# Patient Record
Sex: FEMALE | Race: WHITE | Employment: OTHER | URBAN - METROPOLITAN AREA
[De-identification: names, ages, dates, MRNs, and addresses within clinical notes are randomized per-mention and may not be internally consistent; named-entity substitution may affect disease eponyms.]

---

## 2018-01-20 PROBLEM — K92.2 GI BLEED: Status: ACTIVE | Noted: 2018-01-20

## 2018-01-21 PROBLEM — I10 ESSENTIAL HYPERTENSION: Chronic | Status: ACTIVE | Noted: 2018-01-21

## 2018-01-21 PROBLEM — E11.9 TYPE 2 DIABETES MELLITUS WITHOUT COMPLICATION (HCC): Chronic | Status: ACTIVE | Noted: 2018-01-21

## 2018-01-21 PROBLEM — J45.20 MILD INTERMITTENT ASTHMA WITHOUT COMPLICATION: Chronic | Status: ACTIVE | Noted: 2018-01-21

## 2018-01-22 PROBLEM — D62 ACUTE BLOOD LOSS ANEMIA: Status: ACTIVE | Noted: 2018-01-22

## 2019-03-18 LAB
AVERAGE GLUCOSE: NORMAL
HBA1C MFR BLD: 6.1 %

## 2019-04-01 ENCOUNTER — HOSPITAL ENCOUNTER (OUTPATIENT)
Dept: MAMMOGRAPHY | Age: 64
Discharge: HOME | End: 2019-04-03
Payer: MEDICARE

## 2019-04-01 DIAGNOSIS — Z12.31 VISIT FOR SCREENING MAMMOGRAM: ICD-10-CM

## 2019-05-23 ENCOUNTER — OFFICE VISIT (OUTPATIENT)
Dept: FAMILY MEDICINE CLINIC | Age: 64
End: 2019-05-23
Payer: COMMERCIAL

## 2019-05-23 VITALS
WEIGHT: 246 LBS | TEMPERATURE: 97.2 F | SYSTOLIC BLOOD PRESSURE: 134 MMHG | BODY MASS INDEX: 39.53 KG/M2 | HEART RATE: 71 BPM | OXYGEN SATURATION: 98 % | HEIGHT: 66 IN | DIASTOLIC BLOOD PRESSURE: 82 MMHG

## 2019-05-23 DIAGNOSIS — R73.9 HYPERGLYCEMIA: ICD-10-CM

## 2019-05-23 DIAGNOSIS — R19.7 DIARRHEA, UNSPECIFIED TYPE: Primary | ICD-10-CM

## 2019-05-23 LAB
CHP ED QC CHECK: NORMAL
GLUCOSE BLD-MCNC: 138 MG/DL

## 2019-05-23 PROCEDURE — 99214 OFFICE O/P EST MOD 30 MIN: CPT | Performed by: PHYSICIAN ASSISTANT

## 2019-05-23 PROCEDURE — 82962 GLUCOSE BLOOD TEST: CPT | Performed by: PHYSICIAN ASSISTANT

## 2019-05-23 RX ORDER — ALBUTEROL SULFATE 90 UG/1
AEROSOL, METERED RESPIRATORY (INHALATION)
COMMUNITY
Start: 2014-01-16 | End: 2022-03-14

## 2019-05-23 RX ORDER — FLUTICASONE PROPIONATE 110 UG/1
AEROSOL, METERED RESPIRATORY (INHALATION)
COMMUNITY
Start: 2015-09-29 | End: 2022-03-14

## 2019-05-23 RX ORDER — IPRATROPIUM BROMIDE AND ALBUTEROL SULFATE 2.5; .5 MG/3ML; MG/3ML
SOLUTION RESPIRATORY (INHALATION)
COMMUNITY
Start: 2014-01-16 | End: 2020-04-30

## 2019-05-23 NOTE — PROGRESS NOTES
19  Elio Ek : 1955 Sex: female  Age 59 y.o. Subjective:  Chief Complaint   Patient presents with    Fever    Headache    Diarrhea     yellow,liquid    Nausea    Diabetes     sugar has been high         HPI:   Elio Mcqueen , 59 y.o. female presents to [facility] care for evaluation of diarrhea. The patient states that last weekend she started with some nausea, vomiting, and diarrhea. The patient states that she's had some intermittent crampy abdominal pains and has been having this yellow foul-smelling diarrhea for the last couple of days. The diarrhea seems to be improving. She is able to eat and drink but then usually has an episode of diarrhea shortly thereafter. The patient is not having any specific abdominal pain at this time. Her blood sugars have been on the higher side in the 160s and 170s even without eating. The patient is not having any urinary symptoms. The patient has not been on any recent antibiotics. Not exposed to anyone and nursing facilities. The patient has not traveled out of the country. ROS:   Unless otherwise stated in this report the patient's positive and negative responses for review of systems for constitutional, eyes, ENT, cardiovascular, respiratory, gastrointestinal, neurological, , musculoskeletal, and integument systems and related systems to the presenting problem are either stated in the history of present illness or were not pertinent or were negative for the symptoms and/or complaints related to the presenting medical problem. Positives and pertinent negatives as per HPI. All others reviewed and are negative.       PMH:     Past Medical History:   Diagnosis Date    Depression     Hypertension     Mild intermittent asthma without complication     Osteoarthritis of left knee 2012    Urinary incontinence        Past Surgical History:   Procedure Laterality Date    APPENDECTOMY      ARTERY SURGERY      CARPAL TUNNEL RELEASE      ENDOSCOPY, COLON, DIAGNOSTIC      KNEE SURGERY      TUBAL LIGATION       Medications:     Current Outpatient Medications:     fluticasone (FLOVENT HFA) 110 MCG/ACT inhaler, Inhale into the lungs, Disp: , Rfl:     ipratropium-albuterol (DUONEB) 0.5-2.5 (3) MG/3ML SOLN nebulizer solution, , Disp: , Rfl:     albuterol sulfate HFA (PROAIR HFA) 108 (90 Base) MCG/ACT inhaler, Inhale into the lungs, Disp: , Rfl:     metFORMIN (GLUCOPHAGE) 1000 MG tablet, Take 1,000 mg by mouth 2 times daily (with meals), Disp: , Rfl:     losartan (COZAAR) 100 MG tablet, Take 100 mg by mouth every morning , Disp: , Rfl:     METOPROLOL TARTRATE, Take 12.5 mg by mouth 2 times daily , Disp: , Rfl:     citalopram (CELEXA) 20 MG tablet, Take 20 mg by mouth nightly , Disp: , Rfl:     hydrochlorothiazide (MICROZIDE) 12.5 MG capsule, 12.5 mg every morning., Disp: , Rfl:     Allergies: Allergies   Allergen Reactions    Levofloxacin Rash    Ancef [Cefazolin Sodium] Swelling    Lisinopril Swelling    Lotensin [Benazepril Hcl]     Norco [Hydrocodone-Acetaminophen]      Break out hives       Social History:     Social History     Tobacco Use    Smoking status: Never Smoker    Smokeless tobacco: Never Used   Substance Use Topics    Alcohol use: No    Drug use: No       Physical Exam:     Vitals:    05/23/19 0929   BP: 134/82   Pulse: 71   Temp: 97.2 °F (36.2 °C)   SpO2: 98%   Weight: 246 lb (111.6 kg)   Height: 5' 6\" (1.676 m)       Exam:  Physical Exam  Nurses note and vital signs reviewed and patient is not hypoxic. General: The patient appears well and in no apparent distress. Patient is resting comfortably on cart. Skin: Warm, dry, no pallor noted. There is no rash noted. Head: Normocephalic, atraumatic.   Eye: Normal conjunctiva  Ears, Nose, Mouth, and Throat: Oral mucosa is moist  Cardiovascular: Regular Rate and Rhythm  Respiratory: Patient is in no distress, no accessory muscle use, lungs are clear to auscultation, no wheezing, crackles or rhonchi  Back: Non-tender, no CVA tenderness bilaterally to percussion. GI: Normal bowel sounds, no tenderness to palpation, no masses appreciated. No rebound, guarding, or rigidity noted. Musculoskeletal: The patient has no evidence of calf tenderness, no pitting edema, symmetrical pulses noted bilaterally  Neurological: A&O x4, normal speech      Testing:     Stool cultures pending. Results for orders placed or performed in visit on 05/23/19   POCT Glucose   Result Value Ref Range    Glucose 138 mg/dL    QC OK? Medical Decision Making:     The patient on arrival does not appear to be in any apparent distress or discomfort. The patient will have her glucose checked here. Also set the patient up for stool cultures. The patient will return if any signs or symptoms worsen. We escorted to the lab to have these evaluated. The patient's glucose is 138. She does not appear to be toxic or lethargic. Moist mucous membranes. Patient will have close follow-up with PCP. The patient was instructed on probiotics. Patient is to return if any signs or symptoms worsen. The patient has no other questions or concerns at this time. Clinical Impression:   Giselle Aburto was seen today for fever, headache, diarrhea, nausea and diabetes. Diagnoses and all orders for this visit:    Diarrhea, unspecified type  -     CULTURE STOOL; Future  -     CLOSTRIDIUM DIFFICILE EIA; Future    Hyperglycemia  -     POCT Glucose        The patient is to call for any concerns or return if any of the signs or symptoms worsen. The patient is to follow-up with PCP in the next 2-3 days for repeat evaluation repeat assessment or go directly to the emergency department.      SIGNATURE: Tammie Bond III, PA-C

## 2019-06-03 ENCOUNTER — HOSPITAL ENCOUNTER (OUTPATIENT)
Dept: MAMMOGRAPHY | Age: 64
Discharge: HOME | End: 2019-06-05
Payer: MEDICARE

## 2019-06-03 DIAGNOSIS — Z12.39 BREAST CANCER SCREENING: ICD-10-CM

## 2019-06-03 PROCEDURE — 77067 SCR MAMMO BI INCL CAD: CPT

## 2019-08-15 RX ORDER — LOSARTAN POTASSIUM 100 MG/1
100 TABLET ORAL EVERY MORNING
Qty: 30 TABLET | Refills: 2 | Status: SHIPPED | OUTPATIENT
Start: 2019-08-15 | End: 2019-10-04 | Stop reason: SDUPTHER

## 2019-08-27 RX ORDER — HYDROCHLOROTHIAZIDE 12.5 MG/1
CAPSULE, GELATIN COATED ORAL
Qty: 90 CAPSULE | Refills: 1 | Status: SHIPPED | OUTPATIENT
Start: 2019-08-27 | End: 2019-10-04 | Stop reason: SDUPTHER

## 2019-08-28 RX ORDER — METFORMIN HYDROCHLORIDE 500 MG/1
TABLET, EXTENDED RELEASE ORAL
Qty: 120 TABLET | Refills: 5 | OUTPATIENT
Start: 2019-08-28

## 2019-09-06 RX ORDER — METFORMIN HYDROCHLORIDE 500 MG/1
TABLET, EXTENDED RELEASE ORAL
Qty: 120 TABLET | Refills: 1 | Status: SHIPPED | OUTPATIENT
Start: 2019-09-06 | End: 2019-10-04 | Stop reason: SDUPTHER

## 2019-09-06 NOTE — TELEPHONE ENCOUNTER
Last Appointment:  Visit date not found  Future Appointments   Date Time Provider Sasha Morales   10/4/2019  1:00 PM Ashleigh Harris

## 2019-10-01 ENCOUNTER — HOSPITAL ENCOUNTER (OUTPATIENT)
Age: 64
Discharge: HOME | End: 2019-10-03
Payer: MEDICARE

## 2019-10-01 LAB
ALBUMIN SERPL-MCNC: 4.1 G/DL (ref 3.5–5.2)
ALP BLD-CCNC: 68 U/L (ref 35–104)
ALT SERPL-CCNC: 17 U/L (ref 0–32)
ANION GAP SERPL CALCULATED.3IONS-SCNC: 16 MMOL/L (ref 7–16)
AST SERPL-CCNC: 22 U/L (ref 0–31)
BASOPHILS ABSOLUTE: 0.05 E9/L (ref 0–0.2)
BASOPHILS RELATIVE PERCENT: 0.7 % (ref 0–2)
BILIRUB SERPL-MCNC: 0.5 MG/DL (ref 0–1.2)
BUN BLDV-MCNC: 16 MG/DL (ref 8–23)
CALCIUM SERPL-MCNC: 9.6 MG/DL (ref 8.6–10.2)
CHLORIDE BLD-SCNC: 98 MMOL/L (ref 98–107)
CHOLESTEROL, TOTAL: 183 MG/DL (ref 0–199)
CO2: 24 MMOL/L (ref 22–29)
CREAT SERPL-MCNC: 0.7 MG/DL (ref 0.5–1)
EOSINOPHILS ABSOLUTE: 0.1 E9/L (ref 0.05–0.5)
EOSINOPHILS RELATIVE PERCENT: 1.5 % (ref 0–6)
GFR AFRICAN AMERICAN: >60
GFR NON-AFRICAN AMERICAN: >60 ML/MIN/1.73
GLUCOSE BLD-MCNC: 132 MG/DL (ref 74–99)
HBA1C MFR BLD: 6.2 % (ref 4–5.6)
HCT VFR BLD CALC: 42.3 % (ref 34–48)
HDLC SERPL-MCNC: 55 MG/DL
HEMOGLOBIN: 13.6 G/DL (ref 11.5–15.5)
IMMATURE GRANULOCYTES #: 0.02 E9/L
IMMATURE GRANULOCYTES %: 0.3 % (ref 0–5)
LDL CHOLESTEROL CALCULATED: 88 MG/DL (ref 0–99)
LYMPHOCYTES ABSOLUTE: 1.96 E9/L (ref 1.5–4)
LYMPHOCYTES RELATIVE PERCENT: 29.3 % (ref 20–42)
MCH RBC QN AUTO: 27.7 PG (ref 26–35)
MCHC RBC AUTO-ENTMCNC: 32.2 % (ref 32–34.5)
MCV RBC AUTO: 86.2 FL (ref 80–99.9)
MONOCYTES ABSOLUTE: 0.57 E9/L (ref 0.1–0.95)
MONOCYTES RELATIVE PERCENT: 8.5 % (ref 2–12)
NEUTROPHILS ABSOLUTE: 3.99 E9/L (ref 1.8–7.3)
NEUTROPHILS RELATIVE PERCENT: 59.7 % (ref 43–80)
PDW BLD-RTO: 14.7 FL (ref 11.5–15)
PLATELET # BLD: 209 E9/L (ref 130–450)
PMV BLD AUTO: 10.9 FL (ref 7–12)
POTASSIUM SERPL-SCNC: 4.9 MMOL/L (ref 3.5–5)
RBC # BLD: 4.91 E12/L (ref 3.5–5.5)
SODIUM BLD-SCNC: 138 MMOL/L (ref 132–146)
TOTAL PROTEIN: 7.4 G/DL (ref 6.4–8.3)
TRIGL SERPL-MCNC: 200 MG/DL (ref 0–149)
VLDLC SERPL CALC-MCNC: 40 MG/DL
WBC # BLD: 6.7 E9/L (ref 4.5–11.5)

## 2019-10-01 PROCEDURE — 85025 COMPLETE CBC W/AUTO DIFF WBC: CPT

## 2019-10-01 PROCEDURE — 36415 COLL VENOUS BLD VENIPUNCTURE: CPT

## 2019-10-01 PROCEDURE — 80061 LIPID PANEL: CPT

## 2019-10-01 PROCEDURE — 83036 HEMOGLOBIN GLYCOSYLATED A1C: CPT

## 2019-10-01 PROCEDURE — 80053 COMPREHEN METABOLIC PANEL: CPT

## 2019-10-03 ASSESSMENT — ENCOUNTER SYMPTOMS
BLOOD IN STOOL: 0
TROUBLE SWALLOWING: 0
SHORTNESS OF BREATH: 0
CHEST TIGHTNESS: 0
ABDOMINAL PAIN: 0

## 2019-10-04 ENCOUNTER — OFFICE VISIT (OUTPATIENT)
Dept: PRIMARY CARE CLINIC | Age: 64
End: 2019-10-04
Payer: MEDICARE

## 2019-10-04 VITALS
WEIGHT: 242 LBS | TEMPERATURE: 97.5 F | DIASTOLIC BLOOD PRESSURE: 82 MMHG | OXYGEN SATURATION: 97 % | BODY MASS INDEX: 38.89 KG/M2 | HEIGHT: 66 IN | SYSTOLIC BLOOD PRESSURE: 128 MMHG | HEART RATE: 68 BPM

## 2019-10-04 DIAGNOSIS — J45.909 MILD ASTHMA WITHOUT COMPLICATION, UNSPECIFIED WHETHER PERSISTENT: ICD-10-CM

## 2019-10-04 DIAGNOSIS — R53.83 FATIGUE, UNSPECIFIED TYPE: ICD-10-CM

## 2019-10-04 DIAGNOSIS — E78.2 MIXED HYPERLIPIDEMIA: ICD-10-CM

## 2019-10-04 DIAGNOSIS — I10 ESSENTIAL HYPERTENSION: ICD-10-CM

## 2019-10-04 DIAGNOSIS — F32.0 CURRENT MILD EPISODE OF MAJOR DEPRESSIVE DISORDER WITHOUT PRIOR EPISODE (HCC): ICD-10-CM

## 2019-10-04 DIAGNOSIS — E11.9 TYPE 2 DIABETES MELLITUS WITHOUT COMPLICATION, WITHOUT LONG-TERM CURRENT USE OF INSULIN (HCC): Primary | Chronic | ICD-10-CM

## 2019-10-04 PROCEDURE — 1036F TOBACCO NON-USER: CPT | Performed by: FAMILY MEDICINE

## 2019-10-04 PROCEDURE — G0444 DEPRESSION SCREEN ANNUAL: HCPCS | Performed by: FAMILY MEDICINE

## 2019-10-04 PROCEDURE — G8484 FLU IMMUNIZE NO ADMIN: HCPCS | Performed by: FAMILY MEDICINE

## 2019-10-04 PROCEDURE — 99214 OFFICE O/P EST MOD 30 MIN: CPT | Performed by: FAMILY MEDICINE

## 2019-10-04 PROCEDURE — 3044F HG A1C LEVEL LT 7.0%: CPT | Performed by: FAMILY MEDICINE

## 2019-10-04 PROCEDURE — G8417 CALC BMI ABV UP PARAM F/U: HCPCS | Performed by: FAMILY MEDICINE

## 2019-10-04 PROCEDURE — 2022F DILAT RTA XM EVC RTNOPTHY: CPT | Performed by: FAMILY MEDICINE

## 2019-10-04 PROCEDURE — 3017F COLORECTAL CA SCREEN DOC REV: CPT | Performed by: FAMILY MEDICINE

## 2019-10-04 PROCEDURE — G8428 CUR MEDS NOT DOCUMENT: HCPCS | Performed by: FAMILY MEDICINE

## 2019-10-04 RX ORDER — METOPROLOL TARTRATE 50 MG/1
25 TABLET, FILM COATED ORAL 2 TIMES DAILY
Qty: 60 TABLET | Refills: 2 | Status: SHIPPED
Start: 2019-10-04 | End: 2020-04-01

## 2019-10-04 RX ORDER — LOSARTAN POTASSIUM 100 MG/1
100 TABLET ORAL EVERY MORNING
Qty: 30 TABLET | Refills: 5 | Status: SHIPPED | OUTPATIENT
Start: 2019-10-04 | End: 2019-11-09 | Stop reason: SDUPTHER

## 2019-10-04 RX ORDER — METOPROLOL TARTRATE 50 MG/1
25 TABLET, FILM COATED ORAL 2 TIMES DAILY
Refills: 0 | COMMUNITY
Start: 2019-07-10 | End: 2019-10-04 | Stop reason: SDUPTHER

## 2019-10-04 RX ORDER — HYDROCHLOROTHIAZIDE 12.5 MG/1
CAPSULE, GELATIN COATED ORAL
Qty: 90 CAPSULE | Refills: 2 | Status: SHIPPED
Start: 2019-10-04 | End: 2020-04-30 | Stop reason: SDUPTHER

## 2019-10-04 RX ORDER — CITALOPRAM 20 MG/1
TABLET ORAL
Qty: 45 TABLET | Refills: 5 | Status: SHIPPED
Start: 2019-10-04 | End: 2020-04-30 | Stop reason: SDUPTHER

## 2019-10-04 RX ORDER — OXYBUTYNIN CHLORIDE 5 MG/1
5 TABLET ORAL 2 TIMES DAILY
Qty: 60 TABLET | Refills: 3 | Status: SHIPPED
Start: 2019-10-04 | End: 2020-02-10

## 2019-10-04 RX ORDER — METFORMIN HYDROCHLORIDE 500 MG/1
TABLET, EXTENDED RELEASE ORAL
Qty: 120 TABLET | Refills: 2 | Status: SHIPPED
Start: 2019-10-04 | End: 2020-03-04 | Stop reason: SDUPTHER

## 2019-10-04 ASSESSMENT — PATIENT HEALTH QUESTIONNAIRE - PHQ9
1. LITTLE INTEREST OR PLEASURE IN DOING THINGS: 1
SUM OF ALL RESPONSES TO PHQ QUESTIONS 1-9: 6
9. THOUGHTS THAT YOU WOULD BE BETTER OFF DEAD, OR OF HURTING YOURSELF: 0
8. MOVING OR SPEAKING SO SLOWLY THAT OTHER PEOPLE COULD HAVE NOTICED. OR THE OPPOSITE, BEING SO FIGETY OR RESTLESS THAT YOU HAVE BEEN MOVING AROUND A LOT MORE THAN USUAL: 0
7. TROUBLE CONCENTRATING ON THINGS, SUCH AS READING THE NEWSPAPER OR WATCHING TELEVISION: 0
4. FEELING TIRED OR HAVING LITTLE ENERGY: 1
6. FEELING BAD ABOUT YOURSELF - OR THAT YOU ARE A FAILURE OR HAVE LET YOURSELF OR YOUR FAMILY DOWN: 0
5. POOR APPETITE OR OVEREATING: 1
SUM OF ALL RESPONSES TO PHQ9 QUESTIONS 1 & 2: 3
3. TROUBLE FALLING OR STAYING ASLEEP: 1
10. IF YOU CHECKED OFF ANY PROBLEMS, HOW DIFFICULT HAVE THESE PROBLEMS MADE IT FOR YOU TO DO YOUR WORK, TAKE CARE OF THINGS AT HOME, OR GET ALONG WITH OTHER PEOPLE: 0
SUM OF ALL RESPONSES TO PHQ QUESTIONS 1-9: 6
2. FEELING DOWN, DEPRESSED OR HOPELESS: 2

## 2019-11-11 RX ORDER — LOSARTAN POTASSIUM 100 MG/1
TABLET ORAL
Qty: 90 TABLET | Refills: 1 | Status: SHIPPED
Start: 2019-11-11 | End: 2020-04-06

## 2019-11-22 ENCOUNTER — OFFICE VISIT (OUTPATIENT)
Dept: FAMILY MEDICINE CLINIC | Age: 64
End: 2019-11-22
Payer: MEDICARE

## 2019-11-22 ENCOUNTER — TELEPHONE (OUTPATIENT)
Dept: FAMILY MEDICINE CLINIC | Age: 64
End: 2019-11-22

## 2019-11-22 VITALS — OXYGEN SATURATION: 97 % | SYSTOLIC BLOOD PRESSURE: 142 MMHG | DIASTOLIC BLOOD PRESSURE: 82 MMHG | HEART RATE: 90 BPM

## 2019-11-22 DIAGNOSIS — I10 ESSENTIAL HYPERTENSION: ICD-10-CM

## 2019-11-22 DIAGNOSIS — M25.572 ACUTE LEFT ANKLE PAIN: Primary | ICD-10-CM

## 2019-11-22 DIAGNOSIS — E11.9 TYPE 2 DIABETES MELLITUS WITHOUT COMPLICATION, WITHOUT LONG-TERM CURRENT USE OF INSULIN (HCC): ICD-10-CM

## 2019-11-22 PROCEDURE — 3044F HG A1C LEVEL LT 7.0%: CPT | Performed by: INTERNAL MEDICINE

## 2019-11-22 PROCEDURE — 99213 OFFICE O/P EST LOW 20 MIN: CPT | Performed by: INTERNAL MEDICINE

## 2019-11-22 PROCEDURE — G8417 CALC BMI ABV UP PARAM F/U: HCPCS | Performed by: INTERNAL MEDICINE

## 2019-11-22 PROCEDURE — 3017F COLORECTAL CA SCREEN DOC REV: CPT | Performed by: INTERNAL MEDICINE

## 2019-11-22 PROCEDURE — G8427 DOCREV CUR MEDS BY ELIG CLIN: HCPCS | Performed by: INTERNAL MEDICINE

## 2019-11-22 PROCEDURE — 2022F DILAT RTA XM EVC RTNOPTHY: CPT | Performed by: INTERNAL MEDICINE

## 2019-11-22 PROCEDURE — G8484 FLU IMMUNIZE NO ADMIN: HCPCS | Performed by: INTERNAL MEDICINE

## 2019-11-22 PROCEDURE — 1036F TOBACCO NON-USER: CPT | Performed by: INTERNAL MEDICINE

## 2019-11-22 RX ORDER — MELOXICAM 7.5 MG/1
7.5 TABLET ORAL DAILY
Qty: 30 TABLET | Refills: 0 | Status: SHIPPED | OUTPATIENT
Start: 2019-11-22 | End: 2020-01-02 | Stop reason: SDUPTHER

## 2020-01-02 RX ORDER — MELOXICAM 7.5 MG/1
7.5 TABLET ORAL DAILY
Qty: 90 TABLET | Refills: 1 | Status: SHIPPED
Start: 2020-01-02 | End: 2020-04-30 | Stop reason: SDUPTHER

## 2020-01-02 NOTE — TELEPHONE ENCOUNTER
Last Appointment:  10/4/2019  Future Appointments   Date Time Provider Sasha Mcgrathi   4/3/2020  1:00 PM Edward Edwards [address] [facility]      Patient calling in. She seen  through [facility] care and ws prescribed Mobic. Patient states this is working great and she wants to know if you would be ok calling in a refill?  Order pended, thank you

## 2020-02-03 ENCOUNTER — HOSPITAL ENCOUNTER (OUTPATIENT)
Age: 65
Discharge: HOME | End: 2020-02-05
Payer: MEDICARE

## 2020-02-03 LAB
ALBUMIN SERPL-MCNC: 4.1 G/DL (ref 3.5–5.2)
ALP BLD-CCNC: 63 U/L (ref 35–104)
ALT SERPL-CCNC: 18 U/L (ref 0–32)
ANION GAP SERPL CALCULATED.3IONS-SCNC: 16 MMOL/L (ref 7–16)
AST SERPL-CCNC: 24 U/L (ref 0–31)
BASOPHILS ABSOLUTE: 0.05 E9/L (ref 0–0.2)
BASOPHILS RELATIVE PERCENT: 0.8 % (ref 0–2)
BILIRUB SERPL-MCNC: 0.3 MG/DL (ref 0–1.2)
BUN BLDV-MCNC: 12 MG/DL (ref 8–23)
CALCIUM SERPL-MCNC: 9.4 MG/DL (ref 8.6–10.2)
CHLORIDE BLD-SCNC: 102 MMOL/L (ref 98–107)
CHOLESTEROL, TOTAL: 181 MG/DL (ref 0–199)
CO2: 22 MMOL/L (ref 22–29)
CREAT SERPL-MCNC: 0.7 MG/DL (ref 0.5–1)
EOSINOPHILS ABSOLUTE: 0.1 E9/L (ref 0.05–0.5)
EOSINOPHILS RELATIVE PERCENT: 1.5 % (ref 0–6)
FOLATE: 15 NG/ML (ref 4.8–24.2)
GFR AFRICAN AMERICAN: >60
GFR NON-AFRICAN AMERICAN: >60 ML/MIN/1.73
GLUCOSE BLD-MCNC: 117 MG/DL (ref 74–99)
HBA1C MFR BLD: 6 % (ref 4–5.6)
HCT VFR BLD CALC: 43.8 % (ref 34–48)
HDLC SERPL-MCNC: 56 MG/DL
HEMOGLOBIN: 13.4 G/DL (ref 11.5–15.5)
IMMATURE GRANULOCYTES #: 0.02 E9/L
IMMATURE GRANULOCYTES %: 0.3 % (ref 0–5)
LDL CHOLESTEROL CALCULATED: 90 MG/DL (ref 0–99)
LYMPHOCYTES ABSOLUTE: 1.83 E9/L (ref 1.5–4)
LYMPHOCYTES RELATIVE PERCENT: 28.2 % (ref 20–42)
MCH RBC QN AUTO: 28.2 PG (ref 26–35)
MCHC RBC AUTO-ENTMCNC: 30.6 % (ref 32–34.5)
MCV RBC AUTO: 92.2 FL (ref 80–99.9)
MONOCYTES ABSOLUTE: 0.62 E9/L (ref 0.1–0.95)
MONOCYTES RELATIVE PERCENT: 9.6 % (ref 2–12)
NEUTROPHILS ABSOLUTE: 3.87 E9/L (ref 1.8–7.3)
NEUTROPHILS RELATIVE PERCENT: 59.6 % (ref 43–80)
PDW BLD-RTO: 13.4 FL (ref 11.5–15)
PLATELET # BLD: 227 E9/L (ref 130–450)
PMV BLD AUTO: 10.7 FL (ref 7–12)
POTASSIUM SERPL-SCNC: 4.7 MMOL/L (ref 3.5–5)
RBC # BLD: 4.75 E12/L (ref 3.5–5.5)
SODIUM BLD-SCNC: 140 MMOL/L (ref 132–146)
T4 FREE: 1 NG/DL (ref 0.93–1.7)
TOTAL PROTEIN: 7.3 G/DL (ref 6.4–8.3)
TRIGL SERPL-MCNC: 176 MG/DL (ref 0–149)
TSH SERPL DL<=0.05 MIU/L-ACNC: 1.54 UIU/ML (ref 0.27–4.2)
VITAMIN B-12: 523 PG/ML (ref 211–946)
VLDLC SERPL CALC-MCNC: 35 MG/DL
WBC # BLD: 6.5 E9/L (ref 4.5–11.5)

## 2020-02-03 PROCEDURE — 80061 LIPID PANEL: CPT

## 2020-02-03 PROCEDURE — 84443 ASSAY THYROID STIM HORMONE: CPT

## 2020-02-03 PROCEDURE — 84439 ASSAY OF FREE THYROXINE: CPT

## 2020-02-03 PROCEDURE — 36415 COLL VENOUS BLD VENIPUNCTURE: CPT

## 2020-02-03 PROCEDURE — 82607 VITAMIN B-12: CPT

## 2020-02-03 PROCEDURE — 83036 HEMOGLOBIN GLYCOSYLATED A1C: CPT

## 2020-02-03 PROCEDURE — 80053 COMPREHEN METABOLIC PANEL: CPT

## 2020-02-03 PROCEDURE — 85025 COMPLETE CBC W/AUTO DIFF WBC: CPT

## 2020-02-03 PROCEDURE — 82746 ASSAY OF FOLIC ACID SERUM: CPT

## 2020-02-04 ENCOUNTER — TELEPHONE (OUTPATIENT)
Dept: FAMILY MEDICINE CLINIC | Age: 65
End: 2020-02-04

## 2020-02-10 RX ORDER — OXYBUTYNIN CHLORIDE 5 MG/1
TABLET ORAL
Qty: 60 TABLET | Refills: 0 | Status: SHIPPED
Start: 2020-02-10 | End: 2020-04-06

## 2020-03-04 RX ORDER — METFORMIN HYDROCHLORIDE 500 MG/1
TABLET, EXTENDED RELEASE ORAL
Qty: 120 TABLET | Refills: 2 | Status: SHIPPED
Start: 2020-03-04 | End: 2020-04-30 | Stop reason: SDUPTHER

## 2020-03-04 NOTE — TELEPHONE ENCOUNTER
Last Appointment:  10/4/2019  Future Appointments   Date Time Provider Sasha Morales   4/3/2020  1:00 PM Ethan Decker [address] [facility]      Patient calling in requesting refill on pended medication stated she does not have enough until he next appt.

## 2020-04-01 RX ORDER — METOPROLOL TARTRATE 50 MG/1
TABLET, FILM COATED ORAL
Qty: 60 TABLET | Refills: 0 | Status: SHIPPED
Start: 2020-04-01 | End: 2020-04-30 | Stop reason: SDUPTHER

## 2020-04-06 RX ORDER — LOSARTAN POTASSIUM 100 MG/1
TABLET ORAL
Qty: 30 TABLET | Refills: 0 | Status: SHIPPED
Start: 2020-04-06 | End: 2020-04-30 | Stop reason: SDUPTHER

## 2020-04-06 RX ORDER — OXYBUTYNIN CHLORIDE 5 MG/1
TABLET ORAL
Qty: 60 TABLET | Refills: 0 | Status: SHIPPED
Start: 2020-04-06 | End: 2020-04-30 | Stop reason: SDUPTHER

## 2020-04-11 LAB
ALBUMIN SERPL-MCNC: 4 G/DL
ALP BLD-CCNC: 60 U/L
ALT SERPL-CCNC: 27 U/L
ANION GAP SERPL CALCULATED.3IONS-SCNC: NORMAL MMOL/L
AST SERPL-CCNC: 29 U/L
BASOPHILS ABSOLUTE: 59 /ΜL
BASOPHILS RELATIVE PERCENT: 1 %
BILIRUB SERPL-MCNC: 0.4 MG/DL (ref 0.1–1.4)
BUN BLDV-MCNC: 15 MG/DL
CALCIUM SERPL-MCNC: 9.3 MG/DL
CHLORIDE BLD-SCNC: 102 MMOL/L
CHOLESTEROL, TOTAL: 185 MG/DL
CHOLESTEROL/HDL RATIO: 3.4
CO2: 27 MMOL/L
CREAT SERPL-MCNC: 0.58 MG/DL
EOSINOPHILS ABSOLUTE: 89 /ΜL
EOSINOPHILS RELATIVE PERCENT: 1.5 %
GFR CALCULATED: 97
GLUCOSE BLD-MCNC: 128 MG/DL
HCT VFR BLD CALC: 41.2 % (ref 36–46)
HDLC SERPL-MCNC: 54 MG/DL (ref 35–70)
HEMOGLOBIN: 13.7 G/DL (ref 12–16)
LDL CHOLESTEROL CALCULATED: 97 MG/DL (ref 0–160)
LYMPHOCYTES ABSOLUTE: 1817 /ΜL
LYMPHOCYTES RELATIVE PERCENT: 30.8 %
MCH RBC QN AUTO: 28.8 PG
MCHC RBC AUTO-ENTMCNC: 33.3 G/DL
MCV RBC AUTO: 86.6 FL
MONOCYTES ABSOLUTE: 513 /ΜL
MONOCYTES RELATIVE PERCENT: 8.7 %
NEUTROPHILS ABSOLUTE: 3422 /ΜL
NEUTROPHILS RELATIVE PERCENT: 58 %
PDW BLD-RTO: 13.2 %
PLATELET # BLD: 196 K/ΜL
PMV BLD AUTO: 11 FL
POTASSIUM SERPL-SCNC: 4.4 MMOL/L
RBC # BLD: 4.76 10^6/ΜL
SODIUM BLD-SCNC: 137 MMOL/L
T4 FREE: 1
TOTAL PROTEIN: 6.6
TRIGL SERPL-MCNC: 223 MG/DL
TSH SERPL DL<=0.05 MIU/L-ACNC: 1.73 UIU/ML
VLDLC SERPL CALC-MCNC: 131 MG/DL
WBC # BLD: 5.9 10^3/ML

## 2020-04-30 ENCOUNTER — VIRTUAL VISIT (OUTPATIENT)
Dept: PRIMARY CARE CLINIC | Age: 65
End: 2020-04-30
Payer: MEDICARE

## 2020-04-30 VITALS — SYSTOLIC BLOOD PRESSURE: 144 MMHG | DIASTOLIC BLOOD PRESSURE: 90 MMHG | WEIGHT: 253.6 LBS | BODY MASS INDEX: 40.93 KG/M2

## 2020-04-30 PROCEDURE — 3044F HG A1C LEVEL LT 7.0%: CPT | Performed by: FAMILY MEDICINE

## 2020-04-30 PROCEDURE — 1090F PRES/ABSN URINE INCON ASSESS: CPT | Performed by: FAMILY MEDICINE

## 2020-04-30 PROCEDURE — G8417 CALC BMI ABV UP PARAM F/U: HCPCS | Performed by: FAMILY MEDICINE

## 2020-04-30 PROCEDURE — 2022F DILAT RTA XM EVC RTNOPTHY: CPT | Performed by: FAMILY MEDICINE

## 2020-04-30 PROCEDURE — G8400 PT W/DXA NO RESULTS DOC: HCPCS | Performed by: FAMILY MEDICINE

## 2020-04-30 PROCEDURE — 4040F PNEUMOC VAC/ADMIN/RCVD: CPT | Performed by: FAMILY MEDICINE

## 2020-04-30 PROCEDURE — G8427 DOCREV CUR MEDS BY ELIG CLIN: HCPCS | Performed by: FAMILY MEDICINE

## 2020-04-30 PROCEDURE — 1036F TOBACCO NON-USER: CPT | Performed by: FAMILY MEDICINE

## 2020-04-30 PROCEDURE — 99213 OFFICE O/P EST LOW 20 MIN: CPT | Performed by: FAMILY MEDICINE

## 2020-04-30 PROCEDURE — 1123F ACP DISCUSS/DSCN MKR DOCD: CPT | Performed by: FAMILY MEDICINE

## 2020-04-30 PROCEDURE — 3017F COLORECTAL CA SCREEN DOC REV: CPT | Performed by: FAMILY MEDICINE

## 2020-04-30 RX ORDER — CITALOPRAM 20 MG/1
TABLET ORAL
Qty: 135 TABLET | Refills: 1 | Status: SHIPPED | OUTPATIENT
Start: 2020-04-30

## 2020-04-30 RX ORDER — MELOXICAM 7.5 MG/1
7.5 TABLET ORAL DAILY
Qty: 90 TABLET | Refills: 1 | Status: SHIPPED | OUTPATIENT
Start: 2020-04-30 | End: 2022-03-14

## 2020-04-30 RX ORDER — LOSARTAN POTASSIUM 100 MG/1
TABLET ORAL
Qty: 90 TABLET | Refills: 1 | Status: SHIPPED | OUTPATIENT
Start: 2020-04-30

## 2020-04-30 RX ORDER — METFORMIN HYDROCHLORIDE 500 MG/1
TABLET, EXTENDED RELEASE ORAL
Qty: 120 TABLET | Refills: 5 | Status: SHIPPED | OUTPATIENT
Start: 2020-04-30

## 2020-04-30 RX ORDER — OXYBUTYNIN CHLORIDE 5 MG/1
TABLET ORAL
Qty: 180 TABLET | Refills: 1 | Status: SHIPPED | OUTPATIENT
Start: 2020-04-30 | End: 2022-03-14

## 2020-04-30 RX ORDER — HYDROCHLOROTHIAZIDE 12.5 MG/1
CAPSULE, GELATIN COATED ORAL
Qty: 90 CAPSULE | Refills: 1 | Status: SHIPPED | OUTPATIENT
Start: 2020-04-30

## 2020-04-30 RX ORDER — METOPROLOL TARTRATE 50 MG/1
TABLET, FILM COATED ORAL
Qty: 45 TABLET | Refills: 1 | Status: SHIPPED | OUTPATIENT
Start: 2020-04-30

## 2020-04-30 ASSESSMENT — ENCOUNTER SYMPTOMS
CHEST TIGHTNESS: 0
ABDOMINAL PAIN: 0
BLOOD IN STOOL: 0
TROUBLE SWALLOWING: 0
SHORTNESS OF BREATH: 0

## 2020-04-30 ASSESSMENT — PATIENT HEALTH QUESTIONNAIRE - PHQ9
SUM OF ALL RESPONSES TO PHQ9 QUESTIONS 1 & 2: 0
1. LITTLE INTEREST OR PLEASURE IN DOING THINGS: 0
2. FEELING DOWN, DEPRESSED OR HOPELESS: 0
SUM OF ALL RESPONSES TO PHQ QUESTIONS 1-9: 0
SUM OF ALL RESPONSES TO PHQ QUESTIONS 1-9: 0

## 2020-05-27 ENCOUNTER — HOSPITAL ENCOUNTER (OUTPATIENT)
Age: 65
Discharge: HOME | End: 2020-05-29
Payer: MEDICARE

## 2020-05-27 LAB
FOLATE: >20 NG/ML (ref 4.8–24.2)
VITAMIN B-12: 562 PG/ML (ref 211–946)

## 2020-05-27 PROCEDURE — 82607 VITAMIN B-12: CPT

## 2020-05-27 PROCEDURE — 82746 ASSAY OF FOLIC ACID SERUM: CPT

## 2020-05-27 PROCEDURE — 36415 COLL VENOUS BLD VENIPUNCTURE: CPT

## 2021-03-24 ENCOUNTER — HOSPITAL ENCOUNTER (OUTPATIENT)
Dept: HOSPITAL 83 - ORTHO | Age: 66
Discharge: HOME | End: 2021-03-24
Attending: ORTHOPAEDIC SURGERY
Payer: MEDICARE

## 2021-03-24 DIAGNOSIS — M25.461: ICD-10-CM

## 2021-03-24 DIAGNOSIS — M25.861: ICD-10-CM

## 2021-03-24 DIAGNOSIS — M25.762: ICD-10-CM

## 2021-03-24 DIAGNOSIS — M17.0: Primary | ICD-10-CM

## 2021-05-19 ENCOUNTER — HOSPITAL ENCOUNTER (OUTPATIENT)
Dept: HOSPITAL 83 - CT | Age: 66
Discharge: HOME | End: 2021-05-19
Attending: ORTHOPAEDIC SURGERY
Payer: MEDICARE

## 2021-05-19 DIAGNOSIS — M16.11: ICD-10-CM

## 2021-05-19 DIAGNOSIS — M25.461: ICD-10-CM

## 2021-05-19 DIAGNOSIS — M71.21: ICD-10-CM

## 2021-05-19 DIAGNOSIS — M71.161: ICD-10-CM

## 2021-05-19 DIAGNOSIS — M17.11: Primary | ICD-10-CM

## 2021-07-02 ENCOUNTER — HOSPITAL ENCOUNTER (OUTPATIENT)
Dept: HOSPITAL 83 - LAB | Age: 66
Discharge: HOME | End: 2021-07-02
Attending: ORTHOPAEDIC SURGERY
Payer: MEDICARE

## 2021-07-02 DIAGNOSIS — Z01.818: Primary | ICD-10-CM

## 2021-07-02 DIAGNOSIS — M17.11: ICD-10-CM

## 2021-07-02 LAB
BASOPHILS # BLD AUTO: 0.1 10*3/UL (ref 0–0.1)
BASOPHILS NFR BLD AUTO: 0.7 % (ref 0–1)
BUN SERPL-MCNC: 16 MG/DL (ref 7–24)
CHLORIDE SERPL-SCNC: 106 MMOL/L (ref 98–107)
CREAT SERPL-MCNC: 0.66 MG/DL (ref 0.55–1.02)
EOSINOPHIL # BLD AUTO: 0.1 10*3/UL (ref 0–0.4)
EOSINOPHIL # BLD AUTO: 1.6 % (ref 1–4)
ERYTHROCYTE [DISTWIDTH] IN BLOOD BY AUTOMATED COUNT: 12.8 % (ref 0–14.5)
HCT VFR BLD AUTO: 43.3 % (ref 37–47)
LYMPHOCYTES # BLD AUTO: 2.1 10*3/UL (ref 1.3–4.4)
LYMPHOCYTES NFR BLD AUTO: 29.3 % (ref 27–41)
MCH RBC QN AUTO: 28.8 PG (ref 27–31)
MCHC RBC AUTO-ENTMCNC: 33.3 G/DL (ref 33–37)
MCV RBC AUTO: 86.6 FL (ref 81–99)
MONOCYTES # BLD AUTO: 0.6 10*3/UL (ref 0.1–1)
MONOCYTES NFR BLD MANUAL: 8.8 % (ref 3–9)
NEUT #: 4.4 10*3/UL (ref 2.3–7.9)
NEUT %: 59.5 % (ref 47–73)
NRBC BLD QL AUTO: 0 10*3/UL (ref 0–0)
PLATELET # BLD AUTO: 225 10*3/UL (ref 130–400)
PMV BLD AUTO: 10.4 FL (ref 9.6–12.3)
POTASSIUM SERPL-SCNC: 4.3 MMOL/L (ref 3.5–5.1)
RBC # BLD AUTO: 5 10*6/UL (ref 4.1–5.1)
SODIUM SERPL-SCNC: 135 MMOL/L (ref 136–145)
WBC NRBC COR # BLD AUTO: 7.3 10*3/UL (ref 4.8–10.8)

## 2021-07-13 ENCOUNTER — HOSPITAL ENCOUNTER (INPATIENT)
Dept: HOSPITAL 83 - SDC | Age: 66
LOS: 3 days | Discharge: HOME HEALTH | DRG: 470 | End: 2021-07-16
Attending: INTERNAL MEDICINE | Admitting: INTERNAL MEDICINE
Payer: MEDICARE

## 2021-07-13 VITALS — DIASTOLIC BLOOD PRESSURE: 82 MMHG

## 2021-07-13 VITALS — DIASTOLIC BLOOD PRESSURE: 84 MMHG

## 2021-07-13 VITALS — DIASTOLIC BLOOD PRESSURE: 80 MMHG

## 2021-07-13 VITALS — DIASTOLIC BLOOD PRESSURE: 77 MMHG | SYSTOLIC BLOOD PRESSURE: 126 MMHG

## 2021-07-13 VITALS — DIASTOLIC BLOOD PRESSURE: 74 MMHG

## 2021-07-13 VITALS — DIASTOLIC BLOOD PRESSURE: 77 MMHG

## 2021-07-13 VITALS — SYSTOLIC BLOOD PRESSURE: 128 MMHG | DIASTOLIC BLOOD PRESSURE: 71 MMHG

## 2021-07-13 VITALS — DIASTOLIC BLOOD PRESSURE: 59 MMHG

## 2021-07-13 VITALS — WEIGHT: 266.56 LBS | HEIGHT: 65.98 IN | BODY MASS INDEX: 42.84 KG/M2

## 2021-07-13 VITALS — DIASTOLIC BLOOD PRESSURE: 66 MMHG

## 2021-07-13 DIAGNOSIS — F32.9: ICD-10-CM

## 2021-07-13 DIAGNOSIS — M21.061: ICD-10-CM

## 2021-07-13 DIAGNOSIS — Z80.1: ICD-10-CM

## 2021-07-13 DIAGNOSIS — E87.8: ICD-10-CM

## 2021-07-13 DIAGNOSIS — Z96.651: ICD-10-CM

## 2021-07-13 DIAGNOSIS — Z82.49: ICD-10-CM

## 2021-07-13 DIAGNOSIS — Z88.0: ICD-10-CM

## 2021-07-13 DIAGNOSIS — I10: ICD-10-CM

## 2021-07-13 DIAGNOSIS — Z88.8: ICD-10-CM

## 2021-07-13 DIAGNOSIS — Z90.49: ICD-10-CM

## 2021-07-13 DIAGNOSIS — Z20.822: ICD-10-CM

## 2021-07-13 DIAGNOSIS — M17.11: Primary | ICD-10-CM

## 2021-07-13 DIAGNOSIS — Z83.3: ICD-10-CM

## 2021-07-13 DIAGNOSIS — Z79.899: ICD-10-CM

## 2021-07-13 DIAGNOSIS — E11.65: ICD-10-CM

## 2021-07-13 DIAGNOSIS — E66.9: ICD-10-CM

## 2021-07-13 LAB
ALBUMIN SERPL-MCNC: 3.5 GM/DL (ref 3.1–4.5)
ALP SERPL-CCNC: 66 U/L (ref 45–117)
ALT SERPL W P-5'-P-CCNC: 45 U/L (ref 12–78)
AST SERPL-CCNC: 39 IU/L (ref 3–35)
BASOPHILS # BLD AUTO: 0 10*3/UL (ref 0–0.1)
BASOPHILS NFR BLD AUTO: 0.5 % (ref 0–1)
BUN SERPL-MCNC: 15 MG/DL (ref 7–24)
CHLORIDE SERPL-SCNC: 110 MMOL/L (ref 98–107)
CREAT SERPL-MCNC: 0.65 MG/DL (ref 0.55–1.02)
EOSINOPHIL # BLD AUTO: 0 10*3/UL (ref 0–0.4)
EOSINOPHIL # BLD AUTO: 0.1 % (ref 1–4)
ERYTHROCYTE [DISTWIDTH] IN BLOOD BY AUTOMATED COUNT: 12.9 % (ref 0–14.5)
HCT VFR BLD AUTO: 42.6 % (ref 37–47)
LYMPHOCYTES # BLD AUTO: 0.7 10*3/UL (ref 1.3–4.4)
LYMPHOCYTES NFR BLD AUTO: 9.1 % (ref 27–41)
MCH RBC QN AUTO: 28.2 PG (ref 27–31)
MCHC RBC AUTO-ENTMCNC: 31.2 G/DL (ref 33–37)
MCV RBC AUTO: 90.4 FL (ref 81–99)
MONOCYTES # BLD AUTO: 0.1 10*3/UL (ref 0.1–1)
MONOCYTES NFR BLD MANUAL: 1.9 % (ref 3–9)
NEUT #: 6.6 10*3/UL (ref 2.3–7.9)
NEUT %: 87.7 % (ref 47–73)
NRBC BLD QL AUTO: 0 10*3/UL (ref 0–0)
PLATELET # BLD AUTO: 182 10*3/UL (ref 130–400)
PMV BLD AUTO: 10.6 FL (ref 9.6–12.3)
POTASSIUM SERPL-SCNC: 4.1 MMOL/L (ref 3.5–5.1)
PROT SERPL-MCNC: 7.3 GM/DL (ref 6.4–8.2)
RBC # BLD AUTO: 4.71 10*6/UL (ref 4.1–5.1)
SODIUM SERPL-SCNC: 140 MMOL/L (ref 136–145)
WBC NRBC COR # BLD AUTO: 7.6 10*3/UL (ref 4.8–10.8)

## 2021-07-13 PROCEDURE — 0SRC0J9 REPLACEMENT OF RIGHT KNEE JOINT WITH SYNTHETIC SUBSTITUTE, CEMENTED, OPEN APPROACH: ICD-10-PCS | Performed by: ORTHOPAEDIC SURGERY

## 2021-07-14 VITALS — DIASTOLIC BLOOD PRESSURE: 83 MMHG

## 2021-07-14 VITALS — DIASTOLIC BLOOD PRESSURE: 81 MMHG

## 2021-07-14 VITALS — DIASTOLIC BLOOD PRESSURE: 80 MMHG

## 2021-07-14 VITALS — DIASTOLIC BLOOD PRESSURE: 80 MMHG | SYSTOLIC BLOOD PRESSURE: 158 MMHG

## 2021-07-14 VITALS — SYSTOLIC BLOOD PRESSURE: 156 MMHG | DIASTOLIC BLOOD PRESSURE: 75 MMHG

## 2021-07-14 VITALS — DIASTOLIC BLOOD PRESSURE: 77 MMHG

## 2021-07-14 LAB
BUN SERPL-MCNC: 15 MG/DL (ref 7–24)
CHLORIDE SERPL-SCNC: 105 MMOL/L (ref 98–107)
CREAT SERPL-MCNC: 0.74 MG/DL (ref 0.55–1.02)
POTASSIUM SERPL-SCNC: 3.9 MMOL/L (ref 3.5–5.1)
SODIUM SERPL-SCNC: 135 MMOL/L (ref 136–145)

## 2021-07-15 VITALS — DIASTOLIC BLOOD PRESSURE: 69 MMHG

## 2021-07-15 VITALS — SYSTOLIC BLOOD PRESSURE: 159 MMHG | DIASTOLIC BLOOD PRESSURE: 80 MMHG

## 2021-07-15 VITALS — DIASTOLIC BLOOD PRESSURE: 78 MMHG

## 2021-07-15 VITALS — DIASTOLIC BLOOD PRESSURE: 75 MMHG

## 2021-07-15 VITALS — DIASTOLIC BLOOD PRESSURE: 80 MMHG

## 2021-07-15 LAB
BASOPHILS # BLD AUTO: 0 10*3/UL (ref 0–0.1)
BASOPHILS NFR BLD AUTO: 0.3 % (ref 0–1)
EOSINOPHIL # BLD AUTO: 0 10*3/UL (ref 0–0.4)
EOSINOPHIL # BLD AUTO: 0.3 % (ref 1–4)
ERYTHROCYTE [DISTWIDTH] IN BLOOD BY AUTOMATED COUNT: 13 % (ref 0–14.5)
HCT VFR BLD AUTO: 38 % (ref 37–47)
LYMPHOCYTES # BLD AUTO: 2.4 10*3/UL (ref 1.3–4.4)
LYMPHOCYTES NFR BLD AUTO: 22 % (ref 27–41)
MCH RBC QN AUTO: 28.6 PG (ref 27–31)
MCHC RBC AUTO-ENTMCNC: 32.6 G/DL (ref 33–37)
MCV RBC AUTO: 87.8 FL (ref 81–99)
MONOCYTES # BLD AUTO: 1.3 10*3/UL (ref 0.1–1)
MONOCYTES NFR BLD MANUAL: 11.9 % (ref 3–9)
NEUT #: 7 10*3/UL (ref 2.3–7.9)
NEUT %: 64.9 % (ref 47–73)
NRBC BLD QL AUTO: 0 % (ref 0–0)
PLATELET # BLD AUTO: 214 10*3/UL (ref 130–400)
PMV BLD AUTO: 10.9 FL (ref 9.6–12.3)
RBC # BLD AUTO: 4.33 10*6/UL (ref 4.1–5.1)
WBC NRBC COR # BLD AUTO: 10.8 10*3/UL (ref 4.8–10.8)

## 2021-07-16 VITALS — DIASTOLIC BLOOD PRESSURE: 73 MMHG | SYSTOLIC BLOOD PRESSURE: 136 MMHG

## 2021-07-16 VITALS — DIASTOLIC BLOOD PRESSURE: 69 MMHG

## 2021-07-16 LAB
BASOPHILS # BLD AUTO: 0 10*3/UL (ref 0–0.1)
BASOPHILS NFR BLD AUTO: 0.5 % (ref 0–1)
EOSINOPHIL # BLD AUTO: 0 10*3/UL (ref 0–0.4)
EOSINOPHIL # BLD AUTO: 0.5 % (ref 1–4)
ERYTHROCYTE [DISTWIDTH] IN BLOOD BY AUTOMATED COUNT: 13 % (ref 0–14.5)
HCT VFR BLD AUTO: 34 % (ref 37–47)
LYMPHOCYTES # BLD AUTO: 1.5 10*3/UL (ref 1.3–4.4)
LYMPHOCYTES NFR BLD AUTO: 17.5 % (ref 27–41)
MCH RBC QN AUTO: 28.5 PG (ref 27–31)
MCHC RBC AUTO-ENTMCNC: 32.6 G/DL (ref 33–37)
MCV RBC AUTO: 87.2 FL (ref 81–99)
MONOCYTES # BLD AUTO: 1.1 10*3/UL (ref 0.1–1)
MONOCYTES NFR BLD MANUAL: 13 % (ref 3–9)
NEUT #: 5.7 10*3/UL (ref 2.3–7.9)
NEUT %: 68.1 % (ref 47–73)
NRBC BLD QL AUTO: 0 10*3/UL (ref 0–0)
PLATELET # BLD AUTO: 180 10*3/UL (ref 130–400)
PMV BLD AUTO: 11 FL (ref 9.6–12.3)
RBC # BLD AUTO: 3.9 10*6/UL (ref 4.1–5.1)
WBC NRBC COR # BLD AUTO: 8.3 10*3/UL (ref 4.8–10.8)

## 2021-07-19 ENCOUNTER — HOSPITAL ENCOUNTER (EMERGENCY)
Dept: HOSPITAL 83 - ED | Age: 66
Discharge: HOME | End: 2021-07-19
Payer: MEDICARE

## 2021-07-19 VITALS — HEIGHT: 65.98 IN | WEIGHT: 240 LBS | BODY MASS INDEX: 38.57 KG/M2

## 2021-07-19 DIAGNOSIS — Z90.49: ICD-10-CM

## 2021-07-19 DIAGNOSIS — Z79.899: ICD-10-CM

## 2021-07-19 DIAGNOSIS — Z88.0: ICD-10-CM

## 2021-07-19 DIAGNOSIS — Z88.8: ICD-10-CM

## 2021-07-19 DIAGNOSIS — Z88.1: ICD-10-CM

## 2021-07-19 DIAGNOSIS — Z79.82: ICD-10-CM

## 2021-07-19 DIAGNOSIS — Z96.651: ICD-10-CM

## 2021-07-19 DIAGNOSIS — Z98.51: ICD-10-CM

## 2021-07-19 DIAGNOSIS — M79.661: ICD-10-CM

## 2021-07-19 DIAGNOSIS — N39.0: Primary | ICD-10-CM

## 2021-07-19 DIAGNOSIS — Z98.890: ICD-10-CM

## 2021-07-19 LAB
ALBUMIN SERPL-MCNC: 3.2 GM/DL (ref 3.1–4.5)
ALP SERPL-CCNC: 104 U/L (ref 45–117)
ALT SERPL W P-5'-P-CCNC: 67 U/L (ref 12–78)
APTT PPP: 24 SECONDS (ref 20–32.1)
AST SERPL-CCNC: 41 IU/L (ref 3–35)
BACTERIA #/AREA URNS HPF: (no result) /[HPF]
BASOPHILS # BLD AUTO: 0 10*3/UL (ref 0–0.1)
BASOPHILS NFR BLD AUTO: 0.4 % (ref 0–1)
BILIRUB UR QL STRIP: (no result)
BUN SERPL-MCNC: 15 MG/DL (ref 7–24)
CHLORIDE SERPL-SCNC: 102 MMOL/L (ref 98–107)
CREAT SERPL-MCNC: 0.68 MG/DL (ref 0.55–1.02)
CRYSTALS URNS MICRO: (no result)
EOSINOPHIL # BLD AUTO: 0.1 10*3/UL (ref 0–0.4)
EOSINOPHIL # BLD AUTO: 0.7 % (ref 1–4)
EPI CELLS #/AREA URNS HPF: (no result) /[HPF]
ERYTHROCYTE [DISTWIDTH] IN BLOOD BY AUTOMATED COUNT: 13.1 % (ref 0–14.5)
HCT VFR BLD AUTO: 37.8 % (ref 37–47)
INR BLD: 1 (ref 2–3.5)
LEUKOCYTE ESTERASE UR QL STRIP: (no result)
LYMPHOCYTES # BLD AUTO: 1.7 10*3/UL (ref 1.3–4.4)
LYMPHOCYTES NFR BLD AUTO: 16.1 % (ref 27–41)
MCH RBC QN AUTO: 28.4 PG (ref 27–31)
MCHC RBC AUTO-ENTMCNC: 32 G/DL (ref 33–37)
MCV RBC AUTO: 88.7 FL (ref 81–99)
MONOCYTES # BLD AUTO: 0.8 10*3/UL (ref 0.1–1)
MONOCYTES NFR BLD MANUAL: 7.8 % (ref 3–9)
MUCOUS THREADS URNS QL MICRO: (no result)
NEUT #: 7.7 10*3/UL (ref 2.3–7.9)
NEUT %: 74.3 % (ref 47–73)
NRBC BLD QL AUTO: 0 % (ref 0–0)
PH UR STRIP: 5.5 [PH] (ref 4.5–8)
PLATELET # BLD AUTO: 262 10*3/UL (ref 130–400)
PMV BLD AUTO: 9.8 FL (ref 9.6–12.3)
POTASSIUM SERPL-SCNC: 3.7 MMOL/L (ref 3.5–5.1)
PROT SERPL-MCNC: 7.5 GM/DL (ref 6.4–8.2)
RBC # BLD AUTO: 4.26 10*6/UL (ref 4.1–5.1)
RBC #/AREA URNS HPF: (no result) RBC/HPF (ref 0–2)
SODIUM SERPL-SCNC: 135 MMOL/L (ref 136–145)
SP GR UR: >= 1.03 (ref 1–1.03)
UROBILINOGEN UR STRIP-MCNC: 1 E.U./DL (ref 0–1)
WBC #/AREA URNS HPF: (no result) WBC/HPF (ref 0–5)
WBC NRBC COR # BLD AUTO: 10.3 10*3/UL (ref 4.8–10.8)

## 2021-07-28 ENCOUNTER — HOSPITAL ENCOUNTER (OUTPATIENT)
Dept: HOSPITAL 83 - ORTHO | Age: 66
Discharge: HOME | End: 2021-07-28
Attending: ORTHOPAEDIC SURGERY
Payer: MEDICARE

## 2021-07-28 DIAGNOSIS — Z96.651: ICD-10-CM

## 2021-07-28 DIAGNOSIS — Z47.1: Primary | ICD-10-CM

## 2021-08-25 ENCOUNTER — HOSPITAL ENCOUNTER (OUTPATIENT)
Dept: HOSPITAL 83 - ORTHO | Age: 66
Discharge: HOME | End: 2021-08-25
Attending: ORTHOPAEDIC SURGERY
Payer: MEDICARE

## 2021-08-25 DIAGNOSIS — Z96.651: ICD-10-CM

## 2021-08-25 DIAGNOSIS — Z47.1: Primary | ICD-10-CM

## 2021-10-06 ENCOUNTER — HOSPITAL ENCOUNTER (OUTPATIENT)
Dept: HOSPITAL 83 - ORTHO | Age: 66
Discharge: HOME | End: 2021-10-06
Attending: ORTHOPAEDIC SURGERY
Payer: MEDICARE

## 2021-10-06 DIAGNOSIS — Z96.651: ICD-10-CM

## 2021-10-06 DIAGNOSIS — Z47.1: Primary | ICD-10-CM

## 2021-11-17 ENCOUNTER — HOSPITAL ENCOUNTER (OUTPATIENT)
Dept: HOSPITAL 83 - ORTHO | Age: 66
Discharge: HOME | End: 2021-11-17
Attending: ORTHOPAEDIC SURGERY
Payer: MEDICARE

## 2021-11-17 DIAGNOSIS — Z47.1: Primary | ICD-10-CM

## 2021-11-17 DIAGNOSIS — Z96.651: ICD-10-CM

## 2022-03-14 ENCOUNTER — APPOINTMENT (OUTPATIENT)
Dept: GENERAL RADIOLOGY | Age: 67
DRG: 378 | End: 2022-03-14
Payer: MEDICARE

## 2022-03-14 ENCOUNTER — ANESTHESIA EVENT (OUTPATIENT)
Dept: ENDOSCOPY | Age: 67
DRG: 378 | End: 2022-03-14
Payer: MEDICARE

## 2022-03-14 ENCOUNTER — HOSPITAL ENCOUNTER (INPATIENT)
Age: 67
LOS: 4 days | Discharge: HOME | DRG: 378 | End: 2022-03-18
Attending: STUDENT IN AN ORGANIZED HEALTH CARE EDUCATION/TRAINING PROGRAM | Admitting: INTERNAL MEDICINE
Payer: MEDICARE

## 2022-03-14 ENCOUNTER — APPOINTMENT (OUTPATIENT)
Dept: CT IMAGING | Age: 67
DRG: 378 | End: 2022-03-14
Payer: MEDICARE

## 2022-03-14 DIAGNOSIS — E87.20 LACTIC ACIDOSIS: ICD-10-CM

## 2022-03-14 DIAGNOSIS — D62 ACUTE BLOOD LOSS ANEMIA: ICD-10-CM

## 2022-03-14 DIAGNOSIS — K92.2 GASTROINTESTINAL HEMORRHAGE, UNSPECIFIED GASTROINTESTINAL HEMORRHAGE TYPE: Primary | ICD-10-CM

## 2022-03-14 LAB
ALBUMIN SERPL-MCNC: 3.8 G/DL (ref 3.5–5.2)
ALP BLD-CCNC: 62 U/L (ref 35–104)
ALT SERPL-CCNC: 25 U/L (ref 0–32)
ANION GAP SERPL CALCULATED.3IONS-SCNC: 14 MMOL/L (ref 7–16)
AST SERPL-CCNC: 26 U/L (ref 0–31)
BACTERIA: ABNORMAL /HPF
BASOPHILS ABSOLUTE: 0.04 E9/L (ref 0–0.2)
BASOPHILS RELATIVE PERCENT: 0.4 % (ref 0–2)
BILIRUB SERPL-MCNC: <0.2 MG/DL (ref 0–1.2)
BILIRUBIN URINE: NEGATIVE
BLOOD, URINE: ABNORMAL
BUN BLDV-MCNC: 27 MG/DL (ref 6–23)
CALCIUM SERPL-MCNC: 9.2 MG/DL (ref 8.6–10.2)
CHLORIDE BLD-SCNC: 100 MMOL/L (ref 98–107)
CLARITY: CLEAR
CO2: 23 MMOL/L (ref 22–29)
COLOR: YELLOW
CREAT SERPL-MCNC: 0.5 MG/DL (ref 0.5–1)
D DIMER: <200 NG/ML DDU
EKG ATRIAL RATE: 82 BPM
EKG P AXIS: 67 DEGREES
EKG P-R INTERVAL: 160 MS
EKG Q-T INTERVAL: 402 MS
EKG QRS DURATION: 86 MS
EKG QTC CALCULATION (BAZETT): 469 MS
EKG R AXIS: 31 DEGREES
EKG T AXIS: 42 DEGREES
EKG VENTRICULAR RATE: 82 BPM
EOSINOPHILS ABSOLUTE: 0.1 E9/L (ref 0.05–0.5)
EOSINOPHILS RELATIVE PERCENT: 1.1 % (ref 0–6)
GFR AFRICAN AMERICAN: >60
GFR NON-AFRICAN AMERICAN: >60 ML/MIN/1.73
GLUCOSE BLD-MCNC: 169 MG/DL (ref 74–99)
GLUCOSE URINE: NEGATIVE MG/DL
HCT VFR BLD CALC: 23.4 % (ref 34–48)
HCT VFR BLD CALC: 28.3 % (ref 34–48)
HEMOGLOBIN: 7.4 G/DL (ref 11.5–15.5)
HEMOGLOBIN: 9.4 G/DL (ref 11.5–15.5)
IMMATURE GRANULOCYTES #: 0.17 E9/L
IMMATURE GRANULOCYTES %: 1.9 % (ref 0–5)
KETONES, URINE: NEGATIVE MG/DL
LACTIC ACID: 4.1 MMOL/L (ref 0.5–2.2)
LACTIC ACID: 4.4 MMOL/L (ref 0.5–2.2)
LACTIC ACID: 4.8 MMOL/L (ref 0.5–2.2)
LEUKOCYTE ESTERASE, URINE: ABNORMAL
LIPASE: 65 U/L (ref 13–60)
LYMPHOCYTES ABSOLUTE: 2.11 E9/L (ref 1.5–4)
LYMPHOCYTES RELATIVE PERCENT: 23.1 % (ref 20–42)
MCH RBC QN AUTO: 29.1 PG (ref 26–35)
MCHC RBC AUTO-ENTMCNC: 33.2 % (ref 32–34.5)
MCV RBC AUTO: 87.6 FL (ref 80–99.9)
METER GLUCOSE: 141 MG/DL (ref 74–99)
METER GLUCOSE: 210 MG/DL (ref 74–99)
MONOCYTES ABSOLUTE: 0.72 E9/L (ref 0.1–0.95)
MONOCYTES RELATIVE PERCENT: 7.9 % (ref 2–12)
NEUTROPHILS ABSOLUTE: 6.01 E9/L (ref 1.8–7.3)
NEUTROPHILS RELATIVE PERCENT: 65.6 % (ref 43–80)
NITRITE, URINE: NEGATIVE
PDW BLD-RTO: 13.1 FL (ref 11.5–15)
PH UA: 5 (ref 5–9)
PLATELET # BLD: 200 E9/L (ref 130–450)
PMV BLD AUTO: 10.6 FL (ref 7–12)
POTASSIUM REFLEX MAGNESIUM: 4.4 MMOL/L (ref 3.5–5)
PRO-BNP: 29 PG/ML (ref 0–125)
PROTEIN UA: NEGATIVE MG/DL
RBC # BLD: 3.23 E12/L (ref 3.5–5.5)
RBC UA: ABNORMAL /HPF (ref 0–2)
SODIUM BLD-SCNC: 137 MMOL/L (ref 132–146)
SPECIFIC GRAVITY UA: <=1.005 (ref 1–1.03)
TOTAL PROTEIN: 6.6 G/DL (ref 6.4–8.3)
TROPONIN, HIGH SENSITIVITY: 8 NG/L (ref 0–9)
UROBILINOGEN, URINE: 0.2 E.U./DL
WBC # BLD: 9.2 E9/L (ref 4.5–11.5)
WBC UA: ABNORMAL /HPF (ref 0–5)

## 2022-03-14 PROCEDURE — 2580000003 HC RX 258: Performed by: RADIOLOGY

## 2022-03-14 PROCEDURE — 83605 ASSAY OF LACTIC ACID: CPT

## 2022-03-14 PROCEDURE — 87088 URINE BACTERIA CULTURE: CPT

## 2022-03-14 PROCEDURE — 84484 ASSAY OF TROPONIN QUANT: CPT

## 2022-03-14 PROCEDURE — 93005 ELECTROCARDIOGRAM TRACING: CPT

## 2022-03-14 PROCEDURE — 99284 EMERGENCY DEPT VISIT MOD MDM: CPT

## 2022-03-14 PROCEDURE — 6360000002 HC RX W HCPCS: Performed by: PHYSICIAN ASSISTANT

## 2022-03-14 PROCEDURE — 71045 X-RAY EXAM CHEST 1 VIEW: CPT

## 2022-03-14 PROCEDURE — 6370000000 HC RX 637 (ALT 250 FOR IP): Performed by: INTERNAL MEDICINE

## 2022-03-14 PROCEDURE — 82962 GLUCOSE BLOOD TEST: CPT

## 2022-03-14 PROCEDURE — 6370000000 HC RX 637 (ALT 250 FOR IP): Performed by: PHYSICIAN ASSISTANT

## 2022-03-14 PROCEDURE — 85025 COMPLETE CBC W/AUTO DIFF WBC: CPT

## 2022-03-14 PROCEDURE — 85014 HEMATOCRIT: CPT

## 2022-03-14 PROCEDURE — 80053 COMPREHEN METABOLIC PANEL: CPT

## 2022-03-14 PROCEDURE — 2580000003 HC RX 258: Performed by: PHYSICIAN ASSISTANT

## 2022-03-14 PROCEDURE — 6360000002 HC RX W HCPCS

## 2022-03-14 PROCEDURE — 96360 HYDRATION IV INFUSION INIT: CPT

## 2022-03-14 PROCEDURE — 83690 ASSAY OF LIPASE: CPT

## 2022-03-14 PROCEDURE — 85018 HEMOGLOBIN: CPT

## 2022-03-14 PROCEDURE — 74177 CT ABD & PELVIS W/CONTRAST: CPT

## 2022-03-14 PROCEDURE — 36415 COLL VENOUS BLD VENIPUNCTURE: CPT

## 2022-03-14 PROCEDURE — 87186 SC STD MICRODIL/AGAR DIL: CPT

## 2022-03-14 PROCEDURE — 2140000000 HC CCU INTERMEDIATE R&B

## 2022-03-14 PROCEDURE — 83880 ASSAY OF NATRIURETIC PEPTIDE: CPT

## 2022-03-14 PROCEDURE — C9113 INJ PANTOPRAZOLE SODIUM, VIA: HCPCS | Performed by: PHYSICIAN ASSISTANT

## 2022-03-14 PROCEDURE — 6360000004 HC RX CONTRAST MEDICATION: Performed by: RADIOLOGY

## 2022-03-14 PROCEDURE — 93010 ELECTROCARDIOGRAM REPORT: CPT | Performed by: INTERNAL MEDICINE

## 2022-03-14 PROCEDURE — 2580000003 HC RX 258

## 2022-03-14 PROCEDURE — 96361 HYDRATE IV INFUSION ADD-ON: CPT

## 2022-03-14 PROCEDURE — 81001 URINALYSIS AUTO W/SCOPE: CPT

## 2022-03-14 PROCEDURE — 85378 FIBRIN DEGRADE SEMIQUANT: CPT

## 2022-03-14 PROCEDURE — C9113 INJ PANTOPRAZOLE SODIUM, VIA: HCPCS

## 2022-03-14 PROCEDURE — 2580000003 HC RX 258: Performed by: STUDENT IN AN ORGANIZED HEALTH CARE EDUCATION/TRAINING PROGRAM

## 2022-03-14 RX ORDER — ACETAMINOPHEN 650 MG/1
650 SUPPOSITORY RECTAL EVERY 6 HOURS PRN
Status: DISCONTINUED | OUTPATIENT
Start: 2022-03-14 | End: 2022-03-18 | Stop reason: HOSPADM

## 2022-03-14 RX ORDER — POTASSIUM CHLORIDE 7.45 MG/ML
10 INJECTION INTRAVENOUS PRN
Status: DISCONTINUED | OUTPATIENT
Start: 2022-03-14 | End: 2022-03-18 | Stop reason: HOSPADM

## 2022-03-14 RX ORDER — ACETAMINOPHEN 325 MG/1
650 TABLET ORAL EVERY 6 HOURS PRN
Status: DISCONTINUED | OUTPATIENT
Start: 2022-03-14 | End: 2022-03-18 | Stop reason: HOSPADM

## 2022-03-14 RX ORDER — POTASSIUM CHLORIDE 20 MEQ/1
40 TABLET, EXTENDED RELEASE ORAL PRN
Status: DISCONTINUED | OUTPATIENT
Start: 2022-03-14 | End: 2022-03-18 | Stop reason: HOSPADM

## 2022-03-14 RX ORDER — CITALOPRAM 20 MG/1
20 TABLET ORAL EVERY EVENING
Status: DISCONTINUED | OUTPATIENT
Start: 2022-03-14 | End: 2022-03-18 | Stop reason: HOSPADM

## 2022-03-14 RX ORDER — HYDROCHLOROTHIAZIDE 12.5 MG/1
12.5 TABLET ORAL DAILY
Status: DISCONTINUED | OUTPATIENT
Start: 2022-03-14 | End: 2022-03-18 | Stop reason: HOSPADM

## 2022-03-14 RX ORDER — SODIUM CHLORIDE 0.9 % (FLUSH) 0.9 %
10 SYRINGE (ML) INJECTION PRN
Status: DISCONTINUED | OUTPATIENT
Start: 2022-03-14 | End: 2022-03-18 | Stop reason: HOSPADM

## 2022-03-14 RX ORDER — SODIUM CHLORIDE 0.9 % (FLUSH) 0.9 %
10 SYRINGE (ML) INJECTION
Status: COMPLETED | OUTPATIENT
Start: 2022-03-14 | End: 2022-03-14

## 2022-03-14 RX ORDER — SODIUM CHLORIDE 9 MG/ML
INJECTION, SOLUTION INTRAVENOUS CONTINUOUS
Status: DISCONTINUED | OUTPATIENT
Start: 2022-03-14 | End: 2022-03-15

## 2022-03-14 RX ORDER — NICOTINE POLACRILEX 4 MG
15 LOZENGE BUCCAL PRN
Status: DISCONTINUED | OUTPATIENT
Start: 2022-03-14 | End: 2022-03-18 | Stop reason: HOSPADM

## 2022-03-14 RX ORDER — MAGNESIUM SULFATE IN WATER 40 MG/ML
2000 INJECTION, SOLUTION INTRAVENOUS PRN
Status: DISCONTINUED | OUTPATIENT
Start: 2022-03-14 | End: 2022-03-18 | Stop reason: HOSPADM

## 2022-03-14 RX ORDER — 0.9 % SODIUM CHLORIDE 0.9 %
2000 INTRAVENOUS SOLUTION INTRAVENOUS ONCE
Status: COMPLETED | OUTPATIENT
Start: 2022-03-14 | End: 2022-03-14

## 2022-03-14 RX ORDER — 0.9 % SODIUM CHLORIDE 0.9 %
1000 INTRAVENOUS SOLUTION INTRAVENOUS ONCE
Status: COMPLETED | OUTPATIENT
Start: 2022-03-14 | End: 2022-03-14

## 2022-03-14 RX ORDER — ONDANSETRON 4 MG/1
4 TABLET, ORALLY DISINTEGRATING ORAL EVERY 8 HOURS PRN
Status: DISCONTINUED | OUTPATIENT
Start: 2022-03-14 | End: 2022-03-18 | Stop reason: HOSPADM

## 2022-03-14 RX ORDER — CLINDAMYCIN HYDROCHLORIDE 150 MG/1
300 CAPSULE ORAL EVERY 6 HOURS SCHEDULED
Status: COMPLETED | OUTPATIENT
Start: 2022-03-14 | End: 2022-03-16

## 2022-03-14 RX ORDER — SODIUM CHLORIDE 0.9 % (FLUSH) 0.9 %
10 SYRINGE (ML) INJECTION EVERY 12 HOURS SCHEDULED
Status: DISCONTINUED | OUTPATIENT
Start: 2022-03-14 | End: 2022-03-18 | Stop reason: HOSPADM

## 2022-03-14 RX ORDER — SODIUM CHLORIDE 9 MG/ML
25 INJECTION, SOLUTION INTRAVENOUS PRN
Status: DISCONTINUED | OUTPATIENT
Start: 2022-03-14 | End: 2022-03-18 | Stop reason: HOSPADM

## 2022-03-14 RX ORDER — CITALOPRAM 20 MG/1
30 TABLET ORAL EVERY EVENING
Status: DISCONTINUED | OUTPATIENT
Start: 2022-03-14 | End: 2022-03-14

## 2022-03-14 RX ORDER — DEXTROSE MONOHYDRATE 50 MG/ML
100 INJECTION, SOLUTION INTRAVENOUS PRN
Status: DISCONTINUED | OUTPATIENT
Start: 2022-03-14 | End: 2022-03-18 | Stop reason: HOSPADM

## 2022-03-14 RX ORDER — PANTOPRAZOLE SODIUM 40 MG/10ML
40 INJECTION, POWDER, LYOPHILIZED, FOR SOLUTION INTRAVENOUS 2 TIMES DAILY
Status: DISCONTINUED | OUTPATIENT
Start: 2022-03-14 | End: 2022-03-18 | Stop reason: HOSPADM

## 2022-03-14 RX ORDER — ONDANSETRON 2 MG/ML
4 INJECTION INTRAMUSCULAR; INTRAVENOUS EVERY 6 HOURS PRN
Status: DISCONTINUED | OUTPATIENT
Start: 2022-03-14 | End: 2022-03-18 | Stop reason: HOSPADM

## 2022-03-14 RX ORDER — CITALOPRAM 20 MG/1
20 TABLET ORAL EVERY EVENING
Status: DISCONTINUED | OUTPATIENT
Start: 2022-03-15 | End: 2022-03-14

## 2022-03-14 RX ORDER — LOSARTAN POTASSIUM 50 MG/1
100 TABLET ORAL DAILY
Status: DISCONTINUED | OUTPATIENT
Start: 2022-03-14 | End: 2022-03-18 | Stop reason: HOSPADM

## 2022-03-14 RX ORDER — DEXTROSE MONOHYDRATE 25 G/50ML
12.5 INJECTION, SOLUTION INTRAVENOUS PRN
Status: DISCONTINUED | OUTPATIENT
Start: 2022-03-14 | End: 2022-03-18 | Stop reason: HOSPADM

## 2022-03-14 RX ADMIN — PANTOPRAZOLE SODIUM 40 MG: 40 INJECTION, POWDER, FOR SOLUTION INTRAVENOUS at 21:07

## 2022-03-14 RX ADMIN — CITALOPRAM HYDROBROMIDE 20 MG: 20 TABLET ORAL at 21:07

## 2022-03-14 RX ADMIN — Medication 10 ML: at 21:07

## 2022-03-14 RX ADMIN — SODIUM CHLORIDE 1000 ML: 9 INJECTION, SOLUTION INTRAVENOUS at 13:38

## 2022-03-14 RX ADMIN — PANTOPRAZOLE SODIUM 80 MG: 40 INJECTION, POWDER, FOR SOLUTION INTRAVENOUS at 14:10

## 2022-03-14 RX ADMIN — SODIUM CHLORIDE 2000 ML: 9 INJECTION, SOLUTION INTRAVENOUS at 09:43

## 2022-03-14 RX ADMIN — SODIUM CHLORIDE 1000 ML: 9 INJECTION, SOLUTION INTRAVENOUS at 12:19

## 2022-03-14 RX ADMIN — Medication 10 ML: at 10:46

## 2022-03-14 RX ADMIN — METOPROLOL TARTRATE 25 MG: 25 TABLET, FILM COATED ORAL at 21:07

## 2022-03-14 RX ADMIN — SODIUM CHLORIDE: 9 INJECTION, SOLUTION INTRAVENOUS at 17:19

## 2022-03-14 RX ADMIN — CLINDAMYCIN HYDROCHLORIDE 300 MG: 150 CAPSULE ORAL at 21:31

## 2022-03-14 RX ADMIN — IOPAMIDOL 90 ML: 755 INJECTION, SOLUTION INTRAVENOUS at 10:46

## 2022-03-14 ASSESSMENT — ENCOUNTER SYMPTOMS
ABDOMINAL PAIN: 0
RHINORRHEA: 0
EYE REDNESS: 0
BLOOD IN STOOL: 1
NAUSEA: 0
CONSTIPATION: 0
DIARRHEA: 0
CHEST TIGHTNESS: 0
VOMITING: 0
SHORTNESS OF BREATH: 0
BACK PAIN: 0
SORE THROAT: 0
WHEEZING: 0
EYE ITCHING: 0

## 2022-03-14 ASSESSMENT — PAIN SCALES - GENERAL
PAINLEVEL_OUTOF10: 2
PAINLEVEL_OUTOF10: 0

## 2022-03-14 NOTE — ED NOTES
Patient ambulated to [facility] with assistance. Patient became very SOB.  Pox okay, relieved with rest.     [facility] [employer], RN  03/14/22 3355

## 2022-03-14 NOTE — ED NOTES
Patient arrives ambulatory from triage. Patient states that for the last week she has had increased fatigue, sweating,nausea and heart palpitations with ambulation. Now states she has had black stool for 3 days. Monitor and 12lead show SR. Bed in low position,side rails up,call bell within reach.      [facility]  03/14/22 [id number]

## 2022-03-14 NOTE — ED PROVIDER NOTES
and oriented to person, place, and time. Mental status is at baseline. Cranial Nerves: No cranial nerve deficit. Sensory: No sensory deficit. Motor: No weakness. Psychiatric:         Mood and Affect: Mood normal.         Behavior: Behavior normal.         Thought Content: Thought content normal.         Judgment: Judgment normal.     Patient is Hemoccult positive    Procedures     MDM   Patient presented to the Emergency Department for GI Bleeding (has been having very black stools for 3 days, no thinners) and Chest Pain (mild chest pain for 2 days )    Patient appears to the emergency room in no acute distress with complaints of dark stools possibly due to bleeding and history of GI bleeds in the past.  Surgical history includes cholecystectomy and appendectomy. She also mentions some chest pain that has been mild and going on for the last 2 days. The patient's primary concern is possible GI bleed. Patient is worked up for both cardiac and abdominal etiologies. She is found to have a lactic acid of 4.8. She is also noted to have a hemoglobin of 9.4; She does not have a previous hemoglobin except for almost 2 years ago, but that was 13.7. Patient is given 2 L of saline initially with only marginal improvement in her lactic acid. CT of the abdomen pelvis shows diverticulosis without diverticulitis. There is a left adrenal nodule noted which appears to have been previously identified. No evidence of acute intra-abdominal or pelvic abnormalities. Her D-dimer is also negative which indicates clotting of the lungs or other vessels is unlikely. Even after multiple liters of fluids, patient's lactic acid remains elevated. After discussion with surgery, they believe the patient will need to be admitted as well. The patient is given a total of 4 L of saline here in the emergency room as well as started on Protonix. At this point she will be evaluated further after admission.   Throughout her stay in the emergency department, the patient is in no acute distress and resting comfortably. Her vital signs also remained stable. EKG: This EKG is signed and interpreted by me. Rate: 82  Rhythm: sinus  Axis: normal  Interpretation: no acute changes  Comparison: stable as compared to patient's most recent EKG    ED Course as of 03/14/22 2220   Mon Mar 14, 2022   1019 Hemoccult positive [KS]      ED Course User Index  [KS] Amol Mckeon MD       --------------------------------------------- PAST HISTORY ---------------------------------------------  Past Medical History:  has a past medical history of Depression, Hypertension, Mild intermittent asthma without complication, Osteoarthritis of left knee, and Urinary incontinence. Past Surgical History:  has a past surgical history that includes Tubal ligation; Appendectomy; Artery surgery; Carpal tunnel release; knee surgery; and Endoscopy, colon, diagnostic. Social History:  reports that she has never smoked. She has never used smokeless tobacco. She reports that she does not drink alcohol and does not use drugs. Family History: family history is not on file. The patients home medications have been reviewed.     Allergies: Levofloxacin, Ancef [cefazolin sodium], Lisinopril, Lotensin [benazepril hcl], and Norco [hydrocodone-acetaminophen]    -------------------------------------------------- RESULTS -------------------------------------------------    LABS:  Results for orders placed or performed during the hospital encounter of 03/14/22   CBC with Auto Differential   Result Value Ref Range    WBC 9.2 4.5 - 11.5 E9/L    RBC 3.23 (L) 3.50 - 5.50 E12/L    Hemoglobin 9.4 (L) 11.5 - 15.5 g/dL    Hematocrit 28.3 (L) 34.0 - 48.0 %    MCV 87.6 80.0 - 99.9 fL    MCH 29.1 26.0 - 35.0 pg    MCHC 33.2 32.0 - 34.5 %    RDW 13.1 11.5 - 15.0 fL    Platelets 628 802 - 402 E9/L    MPV 10.6 7.0 - 12.0 fL    Neutrophils % 65.6 43.0 - 80.0 %    Immature Granulocytes % 1.9 0.0 - 5.0 %    Lymphocytes % 23.1 20.0 - 42.0 %    Monocytes % 7.9 2.0 - 12.0 %    Eosinophils % 1.1 0.0 - 6.0 %    Basophils % 0.4 0.0 - 2.0 %    Neutrophils Absolute 6.01 1.80 - 7.30 E9/L    Immature Granulocytes # 0.17 E9/L    Lymphocytes Absolute 2.11 1.50 - 4.00 E9/L    Monocytes Absolute 0.72 0.10 - 0.95 E9/L    Eosinophils Absolute 0.10 0.05 - 0.50 E9/L    Basophils Absolute 0.04 0.00 - 0.20 E9/L   Comprehensive Metabolic Panel w/ Reflex to MG   Result Value Ref Range    Sodium 137 132 - 146 mmol/L    Potassium reflex Magnesium 4.4 3.5 - 5.0 mmol/L    Chloride 100 98 - 107 mmol/L    CO2 23 22 - 29 mmol/L    Anion Gap 14 7 - 16 mmol/L    Glucose 169 (H) 74 - 99 mg/dL    BUN 27 (H) 6 - 23 mg/dL    CREATININE 0.5 0.5 - 1.0 mg/dL    GFR Non-African American >60 >=60 mL/min/1.73    GFR African American >60     Calcium 9.2 8.6 - 10.2 mg/dL    Total Protein 6.6 6.4 - 8.3 g/dL    Albumin 3.8 3.5 - 5.2 g/dL    Total Bilirubin <0.2 0.0 - 1.2 mg/dL    Alkaline Phosphatase 62 35 - 104 U/L    ALT 25 0 - 32 U/L    AST 26 0 - 31 U/L   Lipase   Result Value Ref Range    Lipase 65 (H) 13 - 60 U/L   Troponin   Result Value Ref Range    Troponin, High Sensitivity 8 0 - 9 ng/L   Brain Natriuretic Peptide   Result Value Ref Range    Pro-BNP 29 0 - 125 pg/mL   Urinalysis with Microscopic   Result Value Ref Range    Color, UA Yellow Straw/Yellow    Clarity, UA Clear Clear    Glucose, Ur Negative Negative mg/dL    Bilirubin Urine Negative Negative    Ketones, Urine Negative Negative mg/dL    Specific Gravity, UA <=1.005 1.005 - 1.030    Blood, Urine SMALL (A) Negative    pH, UA 5.0 5.0 - 9.0    Protein, UA Negative Negative mg/dL    Urobilinogen, Urine 0.2 <2.0 E.U./dL    Nitrite, Urine Negative Negative    Leukocyte Esterase, Urine TRACE (A) Negative    WBC, UA 0-1 0 - 5 /HPF    RBC, UA 2-5 0 - 2 /HPF    Bacteria, UA NONE SEEN None Seen /HPF   D-Dimer, Quantitative   Result Value Ref Range    D-Dimer, Quant <200 ng/mL DDU   Lactic Acid   Result Value Ref Range    Lactic Acid 4.8 (HH) 0.5 - 2.2 mmol/L   Lactic Acid   Result Value Ref Range    Lactic Acid 4.1 (HH) 0.5 - 2.2 mmol/L   Lactic Acid   Result Value Ref Range    Lactic Acid 4.4 (HH) 0.5 - 2.2 mmol/L   POCT Glucose   Result Value Ref Range    Meter Glucose 141 (H) 74 - 99 mg/dL   POCT Glucose   Result Value Ref Range    Meter Glucose 210 (H) 74 - 99 mg/dL   EKG 12 Lead   Result Value Ref Range    Ventricular Rate 82 BPM    Atrial Rate 82 BPM    P-R Interval 160 ms    QRS Duration 86 ms    Q-T Interval 402 ms    QTc Calculation (Bazett) 469 ms    P Axis 67 degrees    R Axis 31 degrees    T Axis 42 degrees       RADIOLOGY:  CT ABDOMEN PELVIS W IV CONTRAST Additional Contrast? None   Final Result   There is stability identified in the left adrenal nodule. Diverticulosis of   the colon with no evidence of diverticulitis. No CT evidence of acute   intra-abdominal or pelvic abnormality. Stable scarring identified of the right pericolic gutter with some thickening   of the fascia. No evidence of renal or ureteral stone. No obstruction of   the right kidney. XR CHEST PORTABLE   Final Result   No acute process. ------------------------- NURSING NOTES AND VITALS REVIEWED ---------------------------  Date / Time Roomed:  3/14/2022  8:13 AM  ED Bed Assignment:  [id number]    The nursing notes within the ED encounter and vital signs as below have been reviewed.      Patient Vitals for the past 24 hrs:   BP Temp Temp src Pulse Resp SpO2 Weight   03/14/22 1611 124/65 97.4 °F (36.3 °C) Temporal 92 22 98 % --   03/14/22 1333 118/67 -- -- 95 22 98 % --   03/14/22 1140 112/64 -- -- 81 20 97 % --   03/14/22 1032 126/73 -- -- 87 22 97 % --   03/14/22 0813 134/70 -- -- -- -- -- --   03/14/22 0806 -- 98.3 °F (36.8 °C) -- 94 18 96 % 266 lb (120.7 kg)       Oxygen Saturation Interpretation: Normal    ------------------------------------------ PROGRESS NOTES ------------------------------------------  Re-evaluation(s):  Time: Multiple reevaluation's. Patients symptoms show no change  Repeat physical examination is not changed    Counseling:  I have spoken with the patient and discussed todays results, in addition to providing specific details for the plan of care and counseling regarding the diagnosis and prognosis. Their questions are answered at this time and they are agreeable with the plan of admission.    --------------------------------- ADDITIONAL PROVIDER NOTES ---------------------------------  Consultations:  Time: 5055. Spoke with Brandt. Discussed case. They will admit the patient. This patient's ED course included: a personal history and physicial examination, multiple bedside re-evaluations, IV medications, cardiac monitoring and continuous pulse oximetry    This patient has remained hemodynamically stable during their ED course. Diagnosis:  1. Gastrointestinal hemorrhage, unspecified gastrointestinal hemorrhage type    2. Lactic acidosis    3. Acute blood loss anemia        Disposition:  Patient's disposition: Admit to telemetry  Patient's condition is stable.            Sania March MD  Resident  03/14/22 7446

## 2022-03-14 NOTE — CONSULTS
Sodium] Swelling    Lisinopril Swelling    Lotensin [Benazepril Hcl]     Norco [Hydrocodone-Acetaminophen]      Break out hives       History reviewed. No pertinent family history. Social History     Tobacco Use    Smoking status: Never Smoker    Smokeless tobacco: Never Used   Substance Use Topics    Alcohol use: No    Drug use: No         Review of Systems   General ROS: negative  Hematological and Lymphatic ROS: negative  Respiratory ROS: positive for - shortness of breath  Cardiovascular ROS: no chest pain or dyspnea on exertion  Gastrointestinal ROS: positive for - melena  Genito-Urinary ROS: negative  Musculoskeletal ROS: negative      PHYSICAL EXAM:    Vitals:    03/14/22 1333   BP: 118/67   Pulse: 95   Resp: 22   Temp:    SpO2: 98%       General Appearance:  awake, alert, oriented, in no acute distress  Skin:  Skin color, texture, turgor normal. No rashes or lesions. Head/face:  NCAT  Eyes:  No gross abnormalities. Lungs:  Normal expansion. Clear to auscultation. Heart:  Heart regular rate   Abdomen:  Soft, non-tender, non-distended  Extremities: Extremities warm to touch, pink, with no edema. Female Rectal: FOBT+    LABS:    CBC  Recent Labs     03/14/22  0850   WBC 9.2   HGB 9.4*   HCT 28.3*        BMP  Recent Labs     03/14/22  0850      K 4.4      CO2 23   BUN 27*   CREATININE 0.5   CALCIUM 9.2     Liver Function  Recent Labs     03/14/22  0850   LIPASE 65*   BILITOT <0.2   AST 26   ALT 25   ALKPHOS 62   PROT 6.6   LABALBU 3.8     No results for input(s): LACTATE in the last 72 hours. No results for input(s): INR, PTT in the last 72 hours.     Invalid input(s): PT    RADIOLOGY    CT ABDOMEN PELVIS W IV CONTRAST Additional Contrast? None    Result Date: 3/14/2022  EXAMINATION: CT OF THE ABDOMEN AND PELVIS WITH CONTRAST 3/14/2022 10:40 am TECHNIQUE: CT of the abdomen and pelvis was performed with the administration of intravenous contrast. Multiplanar reformatted images are provided for review. Dose modulation, iterative reconstruction, and/or weight based adjustment of the mA/kV was utilized to reduce the radiation dose to as low as reasonably achievable. COMPARISON: 01/20/2018 HISTORY: ORDERING SYSTEM PROVIDED HISTORY: RUQ and right flank pain TECHNOLOGIST PROVIDED HISTORY: Reason for exam:->RUQ and right flank pain Additional Contrast?->None Decision Support Exception - unselect if not a suspected or confirmed emergency medical condition->Emergency Medical Condition (MA) What reading provider will be dictating this exam?->CRC FINDINGS: Lower Chest: Lung bases are grossly clear. Organs: Liver is homogeneous in appearance. Gallbladder has been surgically removed. No underlying mass or lesion. The spleen is homogeneous. Symmetrical enhancement identified of the renal parenchyma. Both adrenal glands are stable with a nodule identified on the left adrenal gland unchanged in appearance suggesting an adenoma. Pancreas is homogeneous. The gallbladder has been surgically removed. GI/Bowel: Gastrointestinal tract reveals no abnormality within the stomach. The small bowel is unremarkable. No mucosal abnormality or thickening. Stool seen scattered diffusely throughout the colon. Pelvis: Pelvic organs reveal calcifications seen within the uterus suggesting calcified fibroids. Incomplete distension of the bladder with no mass. There is wall thickening likely related to incomplete distension. Peritoneum/Retroperitoneum: No abdominal or retroperitoneal lymphadenopathy. No free fluid or free air. Stable scarring identified and thickening of the fascia within the right paracolic gutter. There are some calcifications seen within the scarring. No pelvic adenopathy. Bones/Soft Tissues: Bony structures reveal degenerative changes seen within the spine. No soft tissue abnormality identified. No ventral abdominal wall mass or lesion.      There is stability identified in the left adrenal nodule. Diverticulosis of the colon with no evidence of diverticulitis. No CT evidence of acute intra-abdominal or pelvic abnormality. Stable scarring identified of the right pericolic gutter with some thickening of the fascia. No evidence of renal or ureteral stone. No obstruction of the right kidney. XR CHEST PORTABLE    Result Date: 3/14/2022  EXAMINATION: ONE XRAY VIEW OF THE CHEST 3/14/2022 8:01 am COMPARISON: 24 April 2015 HISTORY: ORDERING SYSTEM PROVIDED HISTORY: chest pain TECHNOLOGIST PROVIDED HISTORY: Reason for exam:->chest pain What reading provider will be dictating this exam?->CRC FINDINGS: The lungs are without acute focal process. There is no effusion or pneumothorax. The cardiomediastinal silhouette is without acute process. The osseous structures are without acute process. No acute process.          ASSESSMENT:  79 y.o. female with anemia, FOBT+ stool     PLAN:  - plan for EGD tomorrow  - ok for CLD today, NPO at midnight   - PPI  - monitor H/H, transfuse if needed    Discussed with Dr. Guido Mcginnis     Electronically signed by Cady Jordan MD on 3/14/22 at 3:03 PM EDT

## 2022-03-14 NOTE — Clinical Note
Discharge Plan[de-identified] Other/[facility])   Telemetry/Cardiac Monitoring Required?: Yes

## 2022-03-15 ENCOUNTER — ANESTHESIA (OUTPATIENT)
Dept: ENDOSCOPY | Age: 67
DRG: 378 | End: 2022-03-15
Payer: MEDICARE

## 2022-03-15 VITALS
DIASTOLIC BLOOD PRESSURE: 66 MMHG | SYSTOLIC BLOOD PRESSURE: 136 MMHG | RESPIRATION RATE: 20 BRPM | OXYGEN SATURATION: 100 %

## 2022-03-15 LAB
ABO/RH: NORMAL
ANION GAP SERPL CALCULATED.3IONS-SCNC: 9 MMOL/L (ref 7–16)
ANTIBODY SCREEN: NORMAL
BASOPHILS ABSOLUTE: 0.04 E9/L (ref 0–0.2)
BASOPHILS RELATIVE PERCENT: 0.5 % (ref 0–2)
BLOOD BANK DISPENSE STATUS: NORMAL
BLOOD BANK PRODUCT CODE: NORMAL
BPU ID: NORMAL
BUN BLDV-MCNC: 16 MG/DL (ref 6–23)
CALCIUM SERPL-MCNC: 8.3 MG/DL (ref 8.6–10.2)
CHLORIDE BLD-SCNC: 107 MMOL/L (ref 98–107)
CO2: 22 MMOL/L (ref 22–29)
CREAT SERPL-MCNC: 0.4 MG/DL (ref 0.5–1)
DESCRIPTION BLOOD BANK: NORMAL
EOSINOPHILS ABSOLUTE: 0.09 E9/L (ref 0.05–0.5)
EOSINOPHILS RELATIVE PERCENT: 1.2 % (ref 0–6)
GFR AFRICAN AMERICAN: >60
GFR NON-AFRICAN AMERICAN: >60 ML/MIN/1.73
GLUCOSE BLD-MCNC: 130 MG/DL (ref 74–99)
HCT VFR BLD CALC: 22.7 % (ref 34–48)
HCT VFR BLD CALC: 22.9 % (ref 34–48)
HEMOGLOBIN: 7.2 G/DL (ref 11.5–15.5)
HEMOGLOBIN: 7.3 G/DL (ref 11.5–15.5)
IMMATURE GRANULOCYTES #: 0.09 E9/L
IMMATURE GRANULOCYTES %: 1.2 % (ref 0–5)
LACTIC ACID: 2.2 MMOL/L (ref 0.5–2.2)
LYMPHOCYTES ABSOLUTE: 2.41 E9/L (ref 1.5–4)
LYMPHOCYTES RELATIVE PERCENT: 32.7 % (ref 20–42)
MCH RBC QN AUTO: 29.1 PG (ref 26–35)
MCHC RBC AUTO-ENTMCNC: 31.4 % (ref 32–34.5)
MCV RBC AUTO: 92.7 FL (ref 80–99.9)
METER GLUCOSE: 164 MG/DL (ref 74–99)
METER GLUCOSE: 172 MG/DL (ref 74–99)
METER GLUCOSE: 239 MG/DL (ref 74–99)
MONOCYTES ABSOLUTE: 0.66 E9/L (ref 0.1–0.95)
MONOCYTES RELATIVE PERCENT: 8.9 % (ref 2–12)
NEUTROPHILS ABSOLUTE: 4.09 E9/L (ref 1.8–7.3)
NEUTROPHILS RELATIVE PERCENT: 55.5 % (ref 43–80)
PDW BLD-RTO: 13.6 FL (ref 11.5–15)
PLATELET # BLD: 157 E9/L (ref 130–450)
PMV BLD AUTO: 10.5 FL (ref 7–12)
POTASSIUM REFLEX MAGNESIUM: 5.3 MMOL/L (ref 3.5–5)
RBC # BLD: 2.47 E12/L (ref 3.5–5.5)
SODIUM BLD-SCNC: 138 MMOL/L (ref 132–146)
WBC # BLD: 7.4 E9/L (ref 4.5–11.5)

## 2022-03-15 PROCEDURE — 97161 PT EVAL LOW COMPLEX 20 MIN: CPT

## 2022-03-15 PROCEDURE — 83605 ASSAY OF LACTIC ACID: CPT

## 2022-03-15 PROCEDURE — 86850 RBC ANTIBODY SCREEN: CPT

## 2022-03-15 PROCEDURE — 86923 COMPATIBILITY TEST ELECTRIC: CPT

## 2022-03-15 PROCEDURE — 7100000001 HC PACU RECOVERY - ADDTL 15 MIN: Performed by: SURGERY

## 2022-03-15 PROCEDURE — 85014 HEMATOCRIT: CPT

## 2022-03-15 PROCEDURE — 36415 COLL VENOUS BLD VENIPUNCTURE: CPT

## 2022-03-15 PROCEDURE — 80048 BASIC METABOLIC PNL TOTAL CA: CPT

## 2022-03-15 PROCEDURE — P9016 RBC LEUKOCYTES REDUCED: HCPCS

## 2022-03-15 PROCEDURE — 36430 TRANSFUSION BLD/BLD COMPNT: CPT

## 2022-03-15 PROCEDURE — 3609017100 HC EGD: Performed by: SURGERY

## 2022-03-15 PROCEDURE — 86900 BLOOD TYPING SEROLOGIC ABO: CPT

## 2022-03-15 PROCEDURE — 3700000001 HC ADD 15 MINUTES (ANESTHESIA): Performed by: SURGERY

## 2022-03-15 PROCEDURE — 97530 THERAPEUTIC ACTIVITIES: CPT

## 2022-03-15 PROCEDURE — 2709999900 HC NON-CHARGEABLE SUPPLY: Performed by: SURGERY

## 2022-03-15 PROCEDURE — 6360000002 HC RX W HCPCS: Performed by: ANESTHESIOLOGIST ASSISTANT

## 2022-03-15 PROCEDURE — 2580000003 HC RX 258: Performed by: PHYSICIAN ASSISTANT

## 2022-03-15 PROCEDURE — 82962 GLUCOSE BLOOD TEST: CPT

## 2022-03-15 PROCEDURE — 2140000000 HC CCU INTERMEDIATE R&B

## 2022-03-15 PROCEDURE — 86901 BLOOD TYPING SEROLOGIC RH(D): CPT

## 2022-03-15 PROCEDURE — 3700000000 HC ANESTHESIA ATTENDED CARE: Performed by: SURGERY

## 2022-03-15 PROCEDURE — 6370000000 HC RX 637 (ALT 250 FOR IP): Performed by: FAMILY MEDICINE

## 2022-03-15 PROCEDURE — 85025 COMPLETE CBC W/AUTO DIFF WBC: CPT

## 2022-03-15 PROCEDURE — 6370000000 HC RX 637 (ALT 250 FOR IP): Performed by: PHYSICIAN ASSISTANT

## 2022-03-15 PROCEDURE — 0DJ08ZZ INSPECTION OF UPPER INTESTINAL TRACT, VIA NATURAL OR ARTIFICIAL OPENING ENDOSCOPIC: ICD-10-PCS | Performed by: SURGERY

## 2022-03-15 PROCEDURE — 85018 HEMOGLOBIN: CPT

## 2022-03-15 PROCEDURE — 6360000002 HC RX W HCPCS: Performed by: PHYSICIAN ASSISTANT

## 2022-03-15 PROCEDURE — 7100000000 HC PACU RECOVERY - FIRST 15 MIN: Performed by: SURGERY

## 2022-03-15 PROCEDURE — 6370000000 HC RX 637 (ALT 250 FOR IP): Performed by: INTERNAL MEDICINE

## 2022-03-15 PROCEDURE — C9113 INJ PANTOPRAZOLE SODIUM, VIA: HCPCS | Performed by: PHYSICIAN ASSISTANT

## 2022-03-15 PROCEDURE — 6370000000 HC RX 637 (ALT 250 FOR IP): Performed by: SURGERY

## 2022-03-15 RX ORDER — PROPOFOL 10 MG/ML
INJECTION, EMULSION INTRAVENOUS PRN
Status: DISCONTINUED | OUTPATIENT
Start: 2022-03-15 | End: 2022-03-15 | Stop reason: SDUPTHER

## 2022-03-15 RX ORDER — SUCRALFATE 1 G/1
1 TABLET ORAL EVERY 6 HOURS SCHEDULED
Status: DISCONTINUED | OUTPATIENT
Start: 2022-03-15 | End: 2022-03-18 | Stop reason: HOSPADM

## 2022-03-15 RX ORDER — SODIUM CHLORIDE 9 MG/ML
INJECTION, SOLUTION INTRAVENOUS PRN
Status: DISCONTINUED | OUTPATIENT
Start: 2022-03-15 | End: 2022-03-18 | Stop reason: HOSPADM

## 2022-03-15 RX ADMIN — PANTOPRAZOLE SODIUM 40 MG: 40 INJECTION, POWDER, FOR SOLUTION INTRAVENOUS at 09:52

## 2022-03-15 RX ADMIN — SUCRALFATE 1 G: 1 TABLET ORAL at 18:20

## 2022-03-15 RX ADMIN — PANTOPRAZOLE SODIUM 40 MG: 40 INJECTION, POWDER, FOR SOLUTION INTRAVENOUS at 20:25

## 2022-03-15 RX ADMIN — Medication 10 ML: at 20:25

## 2022-03-15 RX ADMIN — Medication 10 ML: at 09:51

## 2022-03-15 RX ADMIN — HYDROCHLOROTHIAZIDE 12.5 MG: 12.5 TABLET ORAL at 09:52

## 2022-03-15 RX ADMIN — CLINDAMYCIN HYDROCHLORIDE 300 MG: 150 CAPSULE ORAL at 13:17

## 2022-03-15 RX ADMIN — METOPROLOL TARTRATE 25 MG: 25 TABLET, FILM COATED ORAL at 20:25

## 2022-03-15 RX ADMIN — INSULIN LISPRO 2 UNITS: 100 INJECTION, SOLUTION INTRAVENOUS; SUBCUTANEOUS at 12:30

## 2022-03-15 RX ADMIN — SUCRALFATE 1 G: 1 TABLET ORAL at 23:44

## 2022-03-15 RX ADMIN — CLINDAMYCIN HYDROCHLORIDE 300 MG: 150 CAPSULE ORAL at 06:00

## 2022-03-15 RX ADMIN — CLINDAMYCIN HYDROCHLORIDE 300 MG: 150 CAPSULE ORAL at 23:44

## 2022-03-15 RX ADMIN — CLINDAMYCIN HYDROCHLORIDE 300 MG: 150 CAPSULE ORAL at 18:19

## 2022-03-15 RX ADMIN — LOSARTAN POTASSIUM 100 MG: 50 TABLET, FILM COATED ORAL at 09:51

## 2022-03-15 RX ADMIN — METOPROLOL TARTRATE 25 MG: 25 TABLET, FILM COATED ORAL at 09:52

## 2022-03-15 RX ADMIN — ACETAMINOPHEN 650 MG: 325 TABLET ORAL at 20:28

## 2022-03-15 RX ADMIN — PROPOFOL 140 MG: 10 INJECTION, EMULSION INTRAVENOUS at 07:13

## 2022-03-15 RX ADMIN — CITALOPRAM HYDROBROMIDE 20 MG: 20 TABLET ORAL at 18:19

## 2022-03-15 ASSESSMENT — PAIN SCALES - GENERAL
PAINLEVEL_OUTOF10: 0
PAINLEVEL_OUTOF10: 5
PAINLEVEL_OUTOF10: 0
PAINLEVEL_OUTOF10: 0

## 2022-03-15 ASSESSMENT — PAIN DESCRIPTION - PAIN TYPE: TYPE: ACUTE PAIN

## 2022-03-15 ASSESSMENT — PAIN DESCRIPTION - LOCATION: LOCATION: HEAD

## 2022-03-15 NOTE — PROGRESS NOTES
Occupational Therapy    OT order received. Chart reviewed. Per speaking with pt, she reports she completes ADLs/functional mobility/transfers Mod I, demo'ing good balance/safety awareness. Pt educated on on shower stool for ease with showering task. Pt demonstrates no OT needs at this time. OT order discontinued. Please re-consult if there is a change in status.      Ina Horn [place], OTR/L [id number]

## 2022-03-15 NOTE — CARE COORDINATION
Patient presented to the ED for GI bleeding (dark stools for 3 days) and mild chest pain. Met with patient and  at bedside for transition of care planning. Patient lives in a home with her  and is independent. Patient has a cane at home, hx of HHC and no hx of [facility]. Uses [facility] and PCP is Dr. Fernando Guy. Plan is home, no needs and  to transport. Per nursing rounds, patient on room air, is on fluids and for an EGD today.     Tone Kitchen, MSW, LSW [phone]

## 2022-03-15 NOTE — ADDENDUM NOTE
Addendum  created 03/15/22 0743 by Iqra Lennon MD    Attestation recorded in [facility], [address] [place] accepted, [facility] Attestations deleted, [address] filed

## 2022-03-15 NOTE — ANESTHESIA POSTPROCEDURE EVALUATION
Department of Anesthesiology  Postprocedure Note    Patient: Chuck Hansen  MRN: [id number]  YOB: 1955  Date of evaluation: 3/15/2022  Time:  7:37 AM     Procedure Summary     Date: 03/15/22 Room / Location: [facility] 01 / [facility]    Anesthesia Start: 0710 Anesthesia Stop: 2946    Procedure: EGD DIAGNOSTIC ONLY (N/A ) Diagnosis: (/)    Surgeons: Analia Bain MD Responsible Provider: Niall Orr MD    Anesthesia Type: MAC ASA Status: 3          Anesthesia Type: MAC    Shivam Phase I: Shivam Score: 10    Shivam Phase II:      Last vitals: Reviewed and per EMR flowsheets.        Anesthesia Post Evaluation    Patient location during evaluation: PACU  Patient participation: complete - patient participated  Level of consciousness: awake  Pain score: 0  Airway patency: patent  Nausea & Vomiting: no nausea  Complications: no  Cardiovascular status: hemodynamically stable  Respiratory status: acceptable  Hydration status: stable

## 2022-03-15 NOTE — PROGRESS NOTES
Physical Therapy  Physical Therapy Initial Assessment     Name: Mary Pickens  : 1955  MRN: [id number]      Date of Service: 3/15/2022    Evaluating PT:  Lorna Perez PT, DPT [id number]     Room #:  7763/5488-T  Diagnosis:  Lactic acidosis [E87.2]  Acute blood loss anemia [D62]  GI bleed [K92.2]  GIB (gastrointestinal bleeding) [K92.2]  Gastrointestinal hemorrhage, unspecified gastrointestinal hemorrhage type [K92.2]  Reason for admission: SOB, weakness   Precautions:  Falls  Procedure/Surgery:  3/15 EGD   Equipment Recommendations:  None     SUBJECTIVE:  Pt lives with  in a 1 story home with ramped entry. Pt ambulated with no AD vs SPC as needed d/t knee pain. OBJECTIVE:   Initial Evaluation  Date: 3/72   AM-PAC 6 Clicks    Was pt agreeable to Eval/treatment? Yes    Does pt have pain? Denies    Bed Mobility  Rolling: NT  Supine to sit: Independent  Sit to supine: NT  Scooting: Independent   Transfers Sit to stand: Independent  Stand to sit:  Independent  Stand pivot: NT   Ambulation    150 feet with IV pole Supervision     Stair negotiation: ascended and descended  NT     LE ROM: WFL    LE Strength:   knee ext: 5/5  ankle DF: 5/5    Balance:   Sitting static: Independent  Sitting dynamic: Independent  Standing static: Supervision  Standing dynamic: Supervision      -Pt is A & O x 3  -Sensation:  Pt denies numbness and tingling to extremities  -Edema:  unremarkable     Therapeutic Exercises:  Functional activity     Patient education  Pt educated on safety, sequencing of transfers, and role of PT    Patient response to education:   Pt verbalized understanding Pt demonstrated skill Pt requires further education in this area   Yes  Yes  No      ASSESSMENT:  Conditions Requiring Skilled Therapeutic Intervention:  NA    Comments:  Pt received supine in bed and agreeable to PT session   Pt is able to complete all mobility without hands on assist. She is showing SOB and elevated heart rate after minimal activity. Her balance is at or near her baseline but she was cautious with ambulating and preferred her hand on the IV pole for support. Extended time needed d/t slow pace of movement and need for rest from SOB. Despite her SOB she maintained 98% SpO2. Pt does not require continued acute PT. She is functioning at or near her independent baseline after this sessions education    Pt with all needs met and call light in reach. Pt would benefit from continued PT POC to address functional deficits described above. Treatment:  Patient practiced and was instructed in the following treatment:     Therapeutic activity  o Patient education provided continuously throughout session for sequencing, safety maintenance, and improving any deficits found during the evaluation. o Gait training- pt was given verbal and tactile cues to facilitate normal endurance during ambulation   o Education on energy conservation and pacing    Pt's/ family goals   1. Return home     Patient and or family understand(s) diagnosis, prognosis, and plan of care. yes    Prognosis is good for reaching above PT goals. PHYSICAL THERAPY PLAN OF CARE:    PT POC is established based on physician order and patient diagnosis     Referring provider/PT Order:  03/14/22 1630   PT evaluation and treat Start: 03/14/22 1630, End: 03/14/22 1630, ONE TIME, Standing Count: 1 Occurrences, R    SISSY Jiang    Diagnosis:  Lactic acidosis [E87.2]  Acute blood loss anemia [D62]  GI bleed [K92.2]  GIB (gastrointestinal bleeding) [K92.2]  Gastrointestinal hemorrhage, unspecified gastrointestinal hemorrhage type [K92.2]    Current Treatment Recommendations:   Does not require further PT intervention acutely. She is near or at independent baseline.      Time in  1000  Time out  1020    Total Treatment Time  8 minutes     Evaluation Time includes thorough review of current medical information, gathering information on past medical history/social history and prior level of function, completion of standardized testing/informal observation of tasks, assessment of data and education on plan of care and goals.     CPT codes:  [x] Low Complexity PT evaluation 05102  [] Moderate Complexity PT evaluation 82118  [] High Complexity PT evaluation 81001  [] PT Re-evaluation 24324  [] Gait training 36672 -* minutes  [] Manual therapy 39592 - minutes  [x] Therapeutic activities 40851 8 minutes  [] Therapeutic exercises 43067 - minutes  [] Neuromuscular reeducation 03377 - minutes     Marisela Kang, PT, DPT  [id number]

## 2022-03-15 NOTE — ANESTHESIA PRE PROCEDURE
Department of Anesthesiology  Preprocedure Note       Name:  Sunday Hernández   Age:  79 y.o.  :  1955                                          MRN:  [id number]         Date:  3/15/2022      Surgeon: Jaspal Arias):  Dina Wilkerson MD    Procedure: Procedure(s):  EGD DIAGNOSTIC ONLY    Medications prior to admission:   Prior to Admission medications    Medication Sig Start Date End Date Taking?  Authorizing Provider   metoprolol tartrate (LOPRESSOR) 50 MG tablet take half a tablet (25MG) by mouth two times daily 20   Gene Castro DO   metFORMIN (GLUCOPHAGE-XR) 500 MG extended release tablet take 2 tablets by mouth twice a day 20   Gene Castro DO   losartan (COZAAR) 100 MG tablet TAKE ONE TABLET BY MOUTH EVERY MORNING 20   Geovanni Chambers DO   hydroCHLOROthiazide (MICROZIDE) 12.5 MG capsule take 1 capsule by mouth once daily 20   Gene Castro DO   citalopram (CELEXA) 20 MG tablet 1 1/2 tabs qd  Patient taking differently: 1/2 tab HS 20   Gene Castro DO       Current medications:    Current Facility-Administered Medications   Medication Dose Route Frequency Provider Last Rate Last Admin    hydroCHLOROthiazide (HYDRODIURIL) tablet 12.5 mg  12.5 mg Oral Daily SISSY Jiang        losartan (COZAAR) tablet 100 mg  100 mg Oral Daily Jared Chakraborty        metoprolol tartrate (LOPRESSOR) tablet 25 mg  25 mg Oral BID SISSY Chakraborty   25 mg at 22    insulin lispro (HUMALOG) injection vial 0-6 Units  0-6 Units SubCUTAneous TID  SISSY Chakraborty        insulin lispro (HUMALOG) injection vial 0-3 Units  0-3 Units SubCUTAneous Nightly SISSY Jiang        glucose (GLUTOSE) 40 % oral gel 15 g  15 g Oral PRN SISSY Chakraborty        dextrose 50 % IV solution  12.5 g IntraVENous PRN SISSY Chakraborty        glucagon (rDNA) injection 1 mg  1 mg IntraMUSCular PRN SISSY Chakraborty        dextrose 5 % solution  100 mL/hr IntraVENous PRN Patient denies any changes in diet, medications, or health that would effect warfarin therapy.  Patient was re-educated on situations that would require placing a call to the coumadin clinic, including bleeding or unusual bruising issues, changes in health, diet or medications, upcoming procedures that require warfarin interruption, and missed coumadin dose(s). Patient expressed understanding that avoidance of consistency with these parameters could cause fluctuations in INR, leading to more frequent visits and increase risk of adverse events.     Patient/caretaker advised by student, Raisa. I have reviewed the student's initial findings and agree with their assessment.  I have personally spoken with and assessed the patient in clinic to devise care plan.     SISSY Mckeon        pantoprazole (PROTONIX) injection 40 mg  40 mg IntraVENous BID SISSY Mckeon   40 mg at 03/14/22 2107    sodium chloride flush 0.9 % injection 10 mL  10 mL IntraVENous 2 times per day SISSY Mckeon   10 mL at 03/14/22 2107    sodium chloride flush 0.9 % injection 10 mL  10 mL IntraVENous PRN SISSY Mckeon        0.9 % sodium chloride infusion  25 mL IntraVENous PRN SISSY Mckeon        potassium chloride (KLOR-CON M) extended release tablet 40 mEq  40 mEq Oral PRN SISSY Mckeon        Or    potassium bicarb-citric acid (EFFER-K) effervescent tablet 40 mEq  40 mEq Oral PRN SISSY Mckeon        Or    potassium chloride 10 mEq/100 mL IVPB (Peripheral Line)  10 mEq IntraVENous PRN SISSY Mckeon        ondansetron (ZOFRAN-ODT) disintegrating tablet 4 mg  4 mg Oral Q8H PRN SISSY Mckeon        Or    ondansetron (ZOFRAN) injection 4 mg  4 mg IntraVENous Q6H PRN SISSY Mckeon        magnesium hydroxide (MILK OF MAGNESIA) 400 MG/5ML suspension 30 mL  30 mL Oral Daily PRN SISSY Mckeon        acetaminophen (TYLENOL) tablet 650 mg  650 mg Oral Q6H PRN SISSY Mckeon        Or    acetaminophen (TYLENOL) suppository 650 mg  650 mg Rectal Q6H PRN SISSY Mckeon        0.9 % sodium chloride infusion   IntraVENous Continuous SISSY Mckeon 50 mL/hr at 03/14/22 1719 New Bag at 03/14/22 1719    magnesium sulfate 2000 mg in 50 mL IVPB premix  2,000 mg IntraVENous PRN SISSY Mckeon        citalopram (CELEXA) tablet 20 mg  20 mg Oral QPM Ximena Ferraro MD   20 mg at 03/14/22 2107    clindamycin (CLEOCIN) capsule 300 mg  300 mg Oral 4 times per day Ximena Ferraro MD   300 mg at 03/15/22 0600       Allergies:     Allergies   Allergen Reactions    Levofloxacin Rash    Ancef [Cefazolin Sodium] Swelling    Lisinopril Swelling    Lotensin [Benazepril Hcl]     Norco [Hydrocodone-Acetaminophen]      Break out hives 03/15/2022    MCV 92.7 03/15/2022    RDW 13.6 03/15/2022     03/15/2022       CMP:   Lab Results   Component Value Date     03/15/2022    K 5.3 03/15/2022     03/15/2022    CO2 22 03/15/2022    BUN 16 03/15/2022    CREATININE 0.4 03/15/2022    GFRAA >60 03/15/2022    LABGLOM >60 03/15/2022    GLUCOSE 130 03/15/2022    GLUCOSE 110 05/20/2011    PROT 6.6 03/14/2022    CALCIUM 8.3 03/15/2022    BILITOT <0.2 03/14/2022    ALKPHOS 62 03/14/2022    AST 26 03/14/2022    ALT 25 03/14/2022       POC Tests: No results for input(s): POCGLU, POCNA, POCK, POCCL, POCBUN, POCHEMO, POCHCT in the last 72 hours. Coags:   Lab Results   Component Value Date    PROTIME 11.9 05/14/2011    INR 1.1 05/14/2011       HCG (If Applicable): No results found for: PREGTESTUR, PREGSERUM, HCG, HCGQUANT     ABGs: No results found for: PHART, PO2ART, REP1KWY, JDL0OFB, BEART, K4WYXVQS     Type & Screen (If Applicable):  No results found for: LABABO, LABRH    Drug/Infectious Status (If Applicable):  No results found for: HIV, HEPCAB    COVID-19 Screening (If Applicable): No results found for: COVID19        Anesthesia Evaluation  Patient summary reviewed  Airway: Mallampati: III  TM distance: <3 FB   Neck ROM: full  Mouth opening: > = 3 FB Dental: normal exam     Comment: Pt claims she has upper left and lower left back molar abscesses was on clindamyacin, no visual swelling or mass in mouth    Pulmonary:   (+) asthma (stable, no inhalers per pt):                           ROS comment: Cough this AM   Cardiovascular:    (+) hypertension:,                   Neuro/Psych:   (+) psychiatric history:depression/anxiety             GI/Hepatic/Renal:            ROS comment: GI bleed. Endo/Other:    (+) DiabetesType II DM, , blood dyscrasia: anemia:., .                 Abdominal:             Vascular: Other Findings:           Anesthesia Plan      MAC     ASA 3       Induction: intravenous.     MIPS: Postoperative opioids intended and Prophylactic antiemetics administered. Anesthetic plan and risks discussed with patient. Plan discussed with CRNA.                   Luciana Rodarte   3/15/2022

## 2022-03-15 NOTE — PLAN OF CARE
Problem: Falls - Risk of:  Goal: Will remain free from falls  Outcome: Ongoing  Goal: Absence of physical injury  Outcome: Ongoing     Problem: Bleeding:  Goal: Will show no signs and symptoms of excessive bleeding  Outcome: Ongoing

## 2022-03-15 NOTE — PROGRESS NOTES
Patients Hgb dropped to 7.4 Doctor [facility] notified,no new orders. Will continue to monitor next H/H at 0700am.

## 2022-03-15 NOTE — OP NOTE
ESOPHAGOGASTRODUODENOSCOPY PROCEDURE NOTE    DATE OF PROCEDURE: 3/15/2022    PREOPERATIVE DIAGNOSIS:  Anemia     POSTOPERATIVE DIAGNOSIS/FINDINGS:  Gastritis, no evidence of bleeding or recent blood loss    SURGEON: Jewel Grey MD    ASSISTANT: None    OPERATION: Esophagogastroduodenoscopy     ANESTHESIA: Local monitored anesthesia. CONDITION: Stable    COMPLICATIONS: None. Specimens Removed: as above    EBL: None    BRIEF HISTORY:  This is a 79 y.o. female who presents with the complaint of anemia. It was recommended the patient undergo an esophagogastrduodenoscopy. The risks/benefits/alternatives/expected outcomes were explained the the patient. The patient verbalized understanding and agreed to proceed. PROCEDURE:  The patient was brought into the endoscopy suite and placed in the left lateral decubitus position. A bite block was placed in the patients mouth. After the initiation of LMAC anesthesia, a gastroscope was inserted into the patient's mouth and passed into the esophagus and into the stomach. Immediately upon entering the stomach the note of gastritis was made. The scope was advanced through the pylorus into the first and second portion of the duodenum making the note of normal mucosa. The scope was then withdrawn back into the stomach where it was retroflexed. There was no hiatal hernia noted. The scope was then straightened out. The scope was then withdrawn the entire length of the esophagus, making the note of a normal esophagus without masses, ulcerations, or lesions noted. The scope was withdrawn entirely. The patient tolerated the procedure well and there were no complications.       Jewel Grey MD, MD  3/15/2022

## 2022-03-15 NOTE — H&P
[facility]  History and Physical      CHIEF COMPLAINT:   Black tarry stools       Patient of Gene Castro DO presents with:  GI bleed    History of Present Illness:   Patient is a 45-year-old female with past medical history of hypertension, depression, intermittent asthma, osteoarthritis of left knee, and urinary incontinence. Patient presented to the ED with complaints of GI bleed. Patient admits she has been having black tarry stools for approximately 7 days. Patient admits this has happened in the past, 2018 and she was started on Protonix. She states that she was scoped during that time and they did not find a GI bleed. Patient states she became tired and weak as well as heart palpitations and she knew her blood count was low. Patient is alert and oriented on examination. Patient denies any chest pain, shortness of breath, fever, chills, headache, dizziness, blurred vision, and abdominal pain. ER work-up reveals hemoglobin 9.4, troponin of 8, lactic acid 4..  Patient is admitted observation for further testing and treatment. REVIEW OF SYSTEMS:  Pertinent negatives are above in HPI. 10 point ROS otherwise negative.       Past Medical History:   Diagnosis Date    Depression     Hypertension     Mild intermittent asthma without complication 8/40/4219    Osteoarthritis of left knee 5/17/2012    Urinary incontinence          Past Surgical History:   Procedure Laterality Date    APPENDECTOMY      ARTERY SURGERY      CARPAL TUNNEL RELEASE      ENDOSCOPY, COLON, DIAGNOSTIC      KNEE SURGERY      TUBAL LIGATION         Medications Prior to Admission:    Medications Prior to Admission: metoprolol tartrate (LOPRESSOR) 50 MG tablet, take half a tablet (25MG) by mouth two times daily  metFORMIN (GLUCOPHAGE-XR) 500 MG extended release tablet, take 2 tablets by mouth twice a day  losartan (COZAAR) 100 MG tablet, TAKE ONE TABLET BY MOUTH EVERY MORNING  hydroCHLOROthiazide (MICROZIDE) 12.5 MG capsule, take 1 capsule by mouth once daily  citalopram (CELEXA) 20 MG tablet, 1 1/2 tabs qd (Patient taking differently: 1/2 tab HS)    Note that the patient's home medications were reviewed and the above list is accurate to the best of my knowledge at the time of the exam.    Allergies:    Levofloxacin, Ancef [cefazolin sodium], Lisinopril, Lotensin [benazepril hcl], and Norco [hydrocodone-acetaminophen]    Social History:    reports that she has never smoked. She has never used smokeless tobacco. She reports that she does not drink alcohol and does not use drugs. Family History:   Unknown at this time      PHYSICAL EXAM:    Vitals:  BP (!) 144/76   Pulse 92   Temp 97.5 °F (36.4 °C) (Oral)   Resp 16   Wt 266 lb (120.7 kg)   SpO2 100%   BMI 42.93 kg/m²       General appearance: NAD, conversant, pleasant, obese  Eyes: Sclerae anicteric, PERRLA  HEENT: AT/NC, MMM  Neck: FROM, supple, no thyromegaly  Lymph: No cervical / supraclavicular lymphadenopathy  Lungs: Clear to auscultation, WOB normal  CV: RRR, no MRGs, no lower extremity edema  Abdomen: Soft, non-tender; no masses or HSM, +BS  Extremities: FROM without synovitis. No clubbing or cyanosis of the hands. Skin: no rash, induration, lesions, or ulcers  Psych: Calm and cooperative. Normal judgement and insight. Normal mood and affect. Neuro: Alert and interactive, face symmetric, speech fluent. 5/5 strength in all extremities    LABS:  All labs reviewed.   Of note:  CBC with Differential:    Lab Results   Component Value Date    WBC 7.4 03/15/2022    RBC 2.47 03/15/2022    HGB 7.2 03/15/2022    HCT 22.9 03/15/2022     03/15/2022    MCV 92.7 03/15/2022    MCH 29.1 03/15/2022    MCHC 31.4 03/15/2022    RDW 13.6 03/15/2022    SEGSPCT 84 05/14/2011    LYMPHOPCT 32.7 03/15/2022    MONOPCT 8.9 03/15/2022    EOSPCT 1.5 04/11/2020    BASOPCT 0.5 03/15/2022    MONOSABS 0.66 03/15/2022    LYMPHSABS 2.41 03/15/2022    EOSABS 0.09 03/15/2022    BASOSABS 0.04 03/15/2022     CMP:    Lab Results   Component Value Date     03/15/2022    K 5.3 03/15/2022     03/15/2022    CO2 22 03/15/2022    BUN 16 03/15/2022    CREATININE 0.4 03/15/2022    GFRAA >60 03/15/2022    LABGLOM >60 03/15/2022    GLUCOSE 130 03/15/2022    GLUCOSE 110 05/20/2011    PROT 6.6 03/14/2022    LABALBU 3.8 03/14/2022    LABALBU 4.2 05/14/2011    CALCIUM 8.3 03/15/2022    BILITOT <0.2 03/14/2022    ALKPHOS 62 03/14/2022    AST 26 03/14/2022    ALT 25 03/14/2022       Imaging:  CXR: No acute process. CT abdomen pelvis: There is stability identified in the left adrenal nodule. Diverticulosis of the colon with no evidence of diverticulitis. Stable scarring identified in the right pericolonic gutter with some thickening of the fascia. No evidence of renal or ureteral stone. No obstruction of the right kidney. EKG:  NSR    Telemetry:  NSR    ASSESSMENT/PLAN:  Principal Problem:    GI bleed  Resolved Problems:    * No resolved hospital problems. *    71-year-old female with past medical history of depression, and hypertension admitted observation to telemetry unit with    GI bleed  -Telemetry monitoring  -H&H every 6 Hours transfuse as needed to keep hemoglobin greater than 7  -Hemoglobin 7.2 will transfuse 1 unit as patient is symptomatic  -GI soft diet  -Hold all blood thinners.  -Consult general surgery - EGD - Severe gastritis  -PPI/Carafate    -Medication for other comorbidities continue as appropriate with dosage adjustments as necessary. DVT prophylaxis-PCD's  PT OT  Discharge planning  Case discussed with attending and agreed upon plan of care.     Code status: Full  Requires inpatient level of care  OH Pandya - CNP    1:21 PM  3/15/2022     Above note edited to reflect my thoughts    H&H 7/22, transfuse 1 unit PRBCs as patient is profoundly weak and her baseline hemoglobin is 14  Continue PPI twice daily Carafate  Await possible colonoscopy by general surgery  Continue to monitor H&H through tonight  May need bleeding scan if C-scope unremarkable      I personally saw, examined and provided care for the patient. Radiographs, labs and medication list were reviewed by me independently. The case was discussed in detail and plans for care were established. Review of Claribel CASIANO- CNP, documentation was conducted and revisions were made as appropriate directly by me. I agree with the above documented exam, problem list, and plan of care.      Dread Matthews MD  6:40 PM  3/15/2022

## 2022-03-16 LAB
ANION GAP SERPL CALCULATED.3IONS-SCNC: 10 MMOL/L (ref 7–16)
BUN BLDV-MCNC: 15 MG/DL (ref 6–23)
CALCIUM SERPL-MCNC: 8.2 MG/DL (ref 8.6–10.2)
CHLORIDE BLD-SCNC: 108 MMOL/L (ref 98–107)
CO2: 23 MMOL/L (ref 22–29)
CREAT SERPL-MCNC: 0.5 MG/DL (ref 0.5–1)
GFR AFRICAN AMERICAN: >60
GFR NON-AFRICAN AMERICAN: >60 ML/MIN/1.73
GLUCOSE BLD-MCNC: 149 MG/DL (ref 74–99)
HCT VFR BLD CALC: 22.4 % (ref 34–48)
HCT VFR BLD CALC: 23.5 % (ref 34–48)
HCT VFR BLD CALC: 25 % (ref 34–48)
HCT VFR BLD CALC: 26.1 % (ref 34–48)
HEMOGLOBIN: 7.3 G/DL (ref 11.5–15.5)
HEMOGLOBIN: 7.6 G/DL (ref 11.5–15.5)
HEMOGLOBIN: 7.9 G/DL (ref 11.5–15.5)
HEMOGLOBIN: 8.2 G/DL (ref 11.5–15.5)
MCH RBC QN AUTO: 29.7 PG (ref 26–35)
MCHC RBC AUTO-ENTMCNC: 31.6 % (ref 32–34.5)
MCV RBC AUTO: 94 FL (ref 80–99.9)
METER GLUCOSE: 147 MG/DL (ref 74–99)
METER GLUCOSE: 195 MG/DL (ref 74–99)
METER GLUCOSE: 230 MG/DL (ref 74–99)
METER GLUCOSE: 252 MG/DL (ref 74–99)
ORGANISM: ABNORMAL
PDW BLD-RTO: 14 FL (ref 11.5–15)
PLATELET # BLD: 160 E9/L (ref 130–450)
PMV BLD AUTO: 11.2 FL (ref 7–12)
POTASSIUM SERPL-SCNC: 4.2 MMOL/L (ref 3.5–5)
RBC # BLD: 2.66 E12/L (ref 3.5–5.5)
SODIUM BLD-SCNC: 141 MMOL/L (ref 132–146)
URINE CULTURE, ROUTINE: ABNORMAL
WBC # BLD: 7.1 E9/L (ref 4.5–11.5)

## 2022-03-16 PROCEDURE — 6370000000 HC RX 637 (ALT 250 FOR IP): Performed by: SURGERY

## 2022-03-16 PROCEDURE — 2580000003 HC RX 258: Performed by: SURGERY

## 2022-03-16 PROCEDURE — C9113 INJ PANTOPRAZOLE SODIUM, VIA: HCPCS | Performed by: SURGERY

## 2022-03-16 PROCEDURE — 6360000002 HC RX W HCPCS: Performed by: SURGERY

## 2022-03-16 PROCEDURE — 6370000000 HC RX 637 (ALT 250 FOR IP): Performed by: FAMILY MEDICINE

## 2022-03-16 PROCEDURE — 85018 HEMOGLOBIN: CPT

## 2022-03-16 PROCEDURE — 85014 HEMATOCRIT: CPT

## 2022-03-16 PROCEDURE — 85027 COMPLETE CBC AUTOMATED: CPT

## 2022-03-16 PROCEDURE — 80048 BASIC METABOLIC PNL TOTAL CA: CPT

## 2022-03-16 PROCEDURE — 1200000000 HC SEMI PRIVATE

## 2022-03-16 PROCEDURE — 82962 GLUCOSE BLOOD TEST: CPT

## 2022-03-16 PROCEDURE — 36415 COLL VENOUS BLD VENIPUNCTURE: CPT

## 2022-03-16 RX ADMIN — SUCRALFATE 1 G: 1 TABLET ORAL at 06:11

## 2022-03-16 RX ADMIN — CLINDAMYCIN HYDROCHLORIDE 300 MG: 150 CAPSULE ORAL at 22:04

## 2022-03-16 RX ADMIN — Medication 10 ML: at 11:35

## 2022-03-16 RX ADMIN — SUCRALFATE 1 G: 1 TABLET ORAL at 23:54

## 2022-03-16 RX ADMIN — CLINDAMYCIN HYDROCHLORIDE 300 MG: 150 CAPSULE ORAL at 06:11

## 2022-03-16 RX ADMIN — SUCRALFATE 1 G: 1 TABLET ORAL at 11:35

## 2022-03-16 RX ADMIN — METOPROLOL TARTRATE 25 MG: 25 TABLET, FILM COATED ORAL at 21:37

## 2022-03-16 RX ADMIN — CITALOPRAM HYDROBROMIDE 20 MG: 20 TABLET ORAL at 21:36

## 2022-03-16 RX ADMIN — PANTOPRAZOLE SODIUM 40 MG: 40 INJECTION, POWDER, FOR SOLUTION INTRAVENOUS at 11:18

## 2022-03-16 RX ADMIN — CLINDAMYCIN HYDROCHLORIDE 300 MG: 150 CAPSULE ORAL at 11:18

## 2022-03-16 RX ADMIN — HYDROCHLOROTHIAZIDE 12.5 MG: 12.5 TABLET ORAL at 11:18

## 2022-03-16 RX ADMIN — Medication 10 ML: at 21:37

## 2022-03-16 RX ADMIN — METOPROLOL TARTRATE 25 MG: 25 TABLET, FILM COATED ORAL at 11:18

## 2022-03-16 RX ADMIN — SUCRALFATE 1 G: 1 TABLET ORAL at 18:24

## 2022-03-16 RX ADMIN — PANTOPRAZOLE SODIUM 40 MG: 40 INJECTION, POWDER, FOR SOLUTION INTRAVENOUS at 21:37

## 2022-03-16 RX ADMIN — LOSARTAN POTASSIUM 100 MG: 50 TABLET, FILM COATED ORAL at 11:18

## 2022-03-16 ASSESSMENT — PAIN SCALES - GENERAL
PAINLEVEL_OUTOF10: 0
PAINLEVEL_OUTOF10: 0

## 2022-03-16 NOTE — PROGRESS NOTES
Subjective: The patient is awake and alert. Sitting in chair without any complaints. Patient not having any abdominal pain, and is moving bowels  No acute events overnight. Denies chest pain, angina, SOB     Objective:    BP (!) 112/59   Pulse 83   Temp 98.5 °F (36.9 °C) (Oral)   Resp 17   Wt 264 lb 8.8 oz (120 kg)   SpO2 97%   BMI 42.70 kg/m²     In: 4219 [P.O.:1120; I.V.:600]  Out: 600   In: 1720   Out: 600 [Urine:600]    General appearance: NAD, conversant  HEENT: AT/NC, MMM  Neck: FROM, supple  Lungs: Clear to auscultation  CV: RRR, no MRGs  Vasc: Radial pulses 2+  Abdomen: Soft, non-tender; no masses or HSM  Extremities: No peripheral edema or digital cyanosis  Skin: no rash, lesions or ulcers  Psych: Alert and oriented to person, place and time  Neuro: Alert and interactive     Recent Labs     03/14/22  0850 03/14/22  2258 03/15/22  0503 03/15/22  0503 03/15/22  1423 03/16/22  0042 03/16/22  0656   WBC 9.2  --  7.4  --   --   --  7.1   HGB 9.4*   < > 7.2*   < > 7.3* 7.3* 8.2*  7.9*   HCT 28.3*   < > 22.9*   < > 22.7* 22.4* 26.1*  25.0*     --  157  --   --   --  160    < > = values in this interval not displayed. Recent Labs     03/14/22  0850 03/15/22  0503 03/16/22  0656    138 141   K 4.4 5.3* 4.2    107 108*   CO2 23 22 23   BUN 27* 16 15   CREATININE 0.5 0.4* 0.5   CALCIUM 9.2 8.3* 8.2*       Assessment:    Principal Problem:    GI bleed  Resolved Problems:    * No resolved hospital problems. *      Plan:    80-year-old female with past medical history of depression, and hypertension admitted observation to telemetry unit with     GI bleed  -Telemetry monitoring  -H&H every 6 Hours transfuse as needed to keep hemoglobin greater than 7  -Hemoglobin 7.2 will transfuse 1 unit as patient is symptomatic - repeat 8.2 will continue to monitor  -GI soft diet  -Hold all blood thinners.  -Consult general surgery - EGD - Severe gastritis.   Monitor H&H possible need for c-scope  -PPI/Carafate - continue    DVT Prophylaxis - pcd's  PT/OT  Discharge planning       OH Cowan CNP  10:26 AM  3/16/2022     Above note edited to reflect my thoughts   Patient indicates she is feeling much better today after 1 unit of PRBC transfused yesterday  Continue to monitor for further decline in H&H   Colonoscopy planned by general surgery if hemoglobin drops further  Please note her current H&H of 7.6 is after she received 1 unit of PRBC for hemoglobin of 7.2 yesterday  Hemodynamically stable    I personally saw, examined and provided care for the patient. Radiographs, labs and medication list were reviewed by me independently. The case was discussed in detail and plans for care were established. Review of Claribel BROWN CNP, documentation was conducted and revisions were made as appropriate directly by me. I agree with the above documented exam, problem list, and plan of care.      Osmani Adkins MD  6:22 PM  3/16/2022

## 2022-03-16 NOTE — PROGRESS NOTES
Per Nichole Carter 50 NP, okay to downgrade patient to med surg.     Rony Meyers RN 03/16/22 12:55 PM

## 2022-03-17 LAB
ANION GAP SERPL CALCULATED.3IONS-SCNC: 11 MMOL/L (ref 7–16)
BUN BLDV-MCNC: 13 MG/DL (ref 6–23)
CALCIUM SERPL-MCNC: 8.5 MG/DL (ref 8.6–10.2)
CHLORIDE BLD-SCNC: 104 MMOL/L (ref 98–107)
CO2: 22 MMOL/L (ref 22–29)
CREAT SERPL-MCNC: 0.5 MG/DL (ref 0.5–1)
GFR AFRICAN AMERICAN: >60
GFR NON-AFRICAN AMERICAN: >60 ML/MIN/1.73
GLUCOSE BLD-MCNC: 160 MG/DL (ref 74–99)
HCT VFR BLD CALC: 22.1 % (ref 34–48)
HCT VFR BLD CALC: 22.9 % (ref 34–48)
HCT VFR BLD CALC: 23.3 % (ref 34–48)
HCT VFR BLD CALC: 23.7 % (ref 34–48)
HEMOGLOBIN: 7.3 G/DL (ref 11.5–15.5)
HEMOGLOBIN: 7.3 G/DL (ref 11.5–15.5)
HEMOGLOBIN: 7.5 G/DL (ref 11.5–15.5)
HEMOGLOBIN: 7.5 G/DL (ref 11.5–15.5)
MCH RBC QN AUTO: 29.2 PG (ref 26–35)
MCHC RBC AUTO-ENTMCNC: 31.9 % (ref 32–34.5)
MCV RBC AUTO: 91.6 FL (ref 80–99.9)
METER GLUCOSE: 201 MG/DL (ref 74–99)
METER GLUCOSE: 211 MG/DL (ref 74–99)
METER GLUCOSE: 221 MG/DL (ref 74–99)
PDW BLD-RTO: 14.5 FL (ref 11.5–15)
PLATELET # BLD: 160 E9/L (ref 130–450)
PMV BLD AUTO: 11 FL (ref 7–12)
POTASSIUM SERPL-SCNC: 4.1 MMOL/L (ref 3.5–5)
RBC # BLD: 2.5 E12/L (ref 3.5–5.5)
SODIUM BLD-SCNC: 137 MMOL/L (ref 132–146)
WBC # BLD: 6.7 E9/L (ref 4.5–11.5)

## 2022-03-17 PROCEDURE — 6370000000 HC RX 637 (ALT 250 FOR IP): Performed by: SURGERY

## 2022-03-17 PROCEDURE — 82962 GLUCOSE BLOOD TEST: CPT

## 2022-03-17 PROCEDURE — 85018 HEMOGLOBIN: CPT

## 2022-03-17 PROCEDURE — 80048 BASIC METABOLIC PNL TOTAL CA: CPT

## 2022-03-17 PROCEDURE — 85014 HEMATOCRIT: CPT

## 2022-03-17 PROCEDURE — 1200000000 HC SEMI PRIVATE

## 2022-03-17 PROCEDURE — 6360000002 HC RX W HCPCS: Performed by: SURGERY

## 2022-03-17 PROCEDURE — C9113 INJ PANTOPRAZOLE SODIUM, VIA: HCPCS | Performed by: SURGERY

## 2022-03-17 PROCEDURE — 6370000000 HC RX 637 (ALT 250 FOR IP): Performed by: FAMILY MEDICINE

## 2022-03-17 PROCEDURE — 36415 COLL VENOUS BLD VENIPUNCTURE: CPT

## 2022-03-17 PROCEDURE — 85027 COMPLETE CBC AUTOMATED: CPT

## 2022-03-17 PROCEDURE — 2580000003 HC RX 258: Performed by: SURGERY

## 2022-03-17 PROCEDURE — 6370000000 HC RX 637 (ALT 250 FOR IP): Performed by: STUDENT IN AN ORGANIZED HEALTH CARE EDUCATION/TRAINING PROGRAM

## 2022-03-17 RX ADMIN — SUCRALFATE 1 G: 1 TABLET ORAL at 06:00

## 2022-03-17 RX ADMIN — CITALOPRAM HYDROBROMIDE 20 MG: 20 TABLET ORAL at 17:28

## 2022-03-17 RX ADMIN — Medication 10 ML: at 07:56

## 2022-03-17 RX ADMIN — LOSARTAN POTASSIUM 100 MG: 50 TABLET, FILM COATED ORAL at 07:56

## 2022-03-17 RX ADMIN — SUCRALFATE 1 G: 1 TABLET ORAL at 17:29

## 2022-03-17 RX ADMIN — Medication 10 ML: at 20:26

## 2022-03-17 RX ADMIN — SUCRALFATE 1 G: 1 TABLET ORAL at 12:25

## 2022-03-17 RX ADMIN — PANTOPRAZOLE SODIUM 40 MG: 40 INJECTION, POWDER, FOR SOLUTION INTRAVENOUS at 07:56

## 2022-03-17 RX ADMIN — METOPROLOL TARTRATE 25 MG: 25 TABLET, FILM COATED ORAL at 20:26

## 2022-03-17 RX ADMIN — METOPROLOL TARTRATE 25 MG: 25 TABLET, FILM COATED ORAL at 07:56

## 2022-03-17 RX ADMIN — INSULIN LISPRO 2 UNITS: 100 INJECTION, SOLUTION INTRAVENOUS; SUBCUTANEOUS at 12:25

## 2022-03-17 RX ADMIN — HYDROCHLOROTHIAZIDE 12.5 MG: 12.5 TABLET ORAL at 07:56

## 2022-03-17 RX ADMIN — PANTOPRAZOLE SODIUM 40 MG: 40 INJECTION, POWDER, FOR SOLUTION INTRAVENOUS at 20:26

## 2022-03-17 RX ADMIN — POLYETHYLENE GLYCOL 3350, SODIUM SULFATE ANHYDROUS, SODIUM BICARBONATE, SODIUM CHLORIDE, POTASSIUM CHLORIDE 4000 ML: 236; 22.74; 6.74; 5.86; 2.97 POWDER, FOR SOLUTION ORAL at 16:30

## 2022-03-17 NOTE — CARE COORDINATION
3/17/22 Update CM note; Patient a transfer from telemetry to general medical. H/H being monitored. Today 7.3/22.9. Had blood completion. Plan is undocumented. EGD gastritis. Will reach out to surgery [facility] [place] team for plan of care.  Electronically signed by Nemesio Hernandez RN CM on 3/17/2022 at 10:50 AM

## 2022-03-17 NOTE — PROGRESS NOTES
Subjective: The patient is awake and alert. Lying in bed states that she has had 2 black tarry stools this am.    No acute events overnight. Denies chest pain, angina, SOB     Objective:    /69   Pulse 79   Temp 97.8 °F (36.6 °C) (Temporal)   Resp 16   Wt 264 lb (119.7 kg)   SpO2 96%   BMI 42.61 kg/m²     In: 1080 [P.O.:1080]  Out: -   In: 1080   Out: -     General appearance: NAD, conversant  HEENT: AT/NC, MMM  Neck: FROM, supple  Lungs: Clear to auscultation  CV: RRR, no MRGs  Vasc: Radial pulses 2+  Abdomen: Soft, non-tender; no masses or HSM  Extremities: No peripheral edema or digital cyanosis  Skin: no rash, lesions or ulcers  Psych: Alert and oriented to person, place and time  Neuro: Alert and interactive     Recent Labs     03/15/22  0503 03/15/22  1423 03/16/22  0656 03/16/22  0656 03/16/22  1537 03/17/22  0254 03/17/22  0539   WBC 7.4  --  7.1  --   --   --  6.7   HGB 7.2*   < > 8.2*  7.9*   < > 7.6* 7.5* 7.3*   HCT 22.9*   < > 26.1*  25.0*   < > 23.5* 23.7* 22.9*     --  160  --   --   --  160    < > = values in this interval not displayed. Recent Labs     03/15/22  0503 03/16/22  0656 03/17/22  0539    141 137   K 5.3* 4.2 4.1    108* 104   CO2 22 23 22   BUN 16 15 13   CREATININE 0.4* 0.5 0.5   CALCIUM 8.3* 8.2* 8.5*       Assessment:    Principal Problem:    GI bleed  Resolved Problems:    * No resolved hospital problems. *      Plan:    58-year-old female with past medical history of depression, and hypertension admitted observation to telemetry unit with     GI bleed  -Telemetry monitoring  -H&H every 6 Hours transfuse as needed to keep hemoglobin greater than 7  -Hemoglobin 7.2 will transfuse 1 unit as patient is symptomatic - repeat 8.2>7.3  -GI soft diet  -Hold all blood thinners.  -Consult general surgery - EGD - Severe gastritis.   Monitor H&H possible need for c-scope  -PPI/Carafate - continue  -Will likely need c-scope    DVT Prophylaxis -

## 2022-03-17 NOTE — PROGRESS NOTES
Attempted to message General Surgery residents. They are in protected time until 12pm. Will re-attempt after that time.

## 2022-03-17 NOTE — PROGRESS NOTES
Spoke to [place] [facility] David and davy from lab regarding h and h not being drawn, not crossing over in their system as lab draw, sending tech up to draw lab

## 2022-03-18 ENCOUNTER — ANESTHESIA (OUTPATIENT)
Dept: ENDOSCOPY | Age: 67
DRG: 378 | End: 2022-03-18
Payer: MEDICARE

## 2022-03-18 ENCOUNTER — ANESTHESIA EVENT (OUTPATIENT)
Dept: ENDOSCOPY | Age: 67
DRG: 378 | End: 2022-03-18
Payer: MEDICARE

## 2022-03-18 VITALS
OXYGEN SATURATION: 100 % | SYSTOLIC BLOOD PRESSURE: 127 MMHG | RESPIRATION RATE: 19 BRPM | DIASTOLIC BLOOD PRESSURE: 66 MMHG

## 2022-03-18 VITALS
DIASTOLIC BLOOD PRESSURE: 80 MMHG | TEMPERATURE: 97.7 F | HEART RATE: 84 BPM | RESPIRATION RATE: 20 BRPM | WEIGHT: 259 LBS | BODY MASS INDEX: 41.8 KG/M2 | SYSTOLIC BLOOD PRESSURE: 141 MMHG | OXYGEN SATURATION: 98 %

## 2022-03-18 LAB
ANION GAP SERPL CALCULATED.3IONS-SCNC: 12 MMOL/L (ref 7–16)
BUN BLDV-MCNC: 11 MG/DL (ref 6–23)
CALCIUM SERPL-MCNC: 8.7 MG/DL (ref 8.6–10.2)
CHLORIDE BLD-SCNC: 101 MMOL/L (ref 98–107)
CO2: 28 MMOL/L (ref 22–29)
CREAT SERPL-MCNC: 0.5 MG/DL (ref 0.5–1)
GFR AFRICAN AMERICAN: >60
GFR NON-AFRICAN AMERICAN: >60 ML/MIN/1.73
GLUCOSE BLD-MCNC: 182 MG/DL (ref 74–99)
HCT VFR BLD CALC: 24.1 % (ref 34–48)
HCT VFR BLD CALC: 26.4 % (ref 34–48)
HCT VFR BLD CALC: 26.7 % (ref 34–48)
HEMOGLOBIN: 7.8 G/DL (ref 11.5–15.5)
HEMOGLOBIN: 8.3 G/DL (ref 11.5–15.5)
HEMOGLOBIN: 8.4 G/DL (ref 11.5–15.5)
MCH RBC QN AUTO: 29.5 PG (ref 26–35)
MCHC RBC AUTO-ENTMCNC: 31.5 % (ref 32–34.5)
MCV RBC AUTO: 93.7 FL (ref 80–99.9)
METER GLUCOSE: 147 MG/DL (ref 74–99)
METER GLUCOSE: 160 MG/DL (ref 74–99)
PDW BLD-RTO: 14.8 FL (ref 11.5–15)
PLATELET # BLD: 224 E9/L (ref 130–450)
PMV BLD AUTO: 10.5 FL (ref 7–12)
POTASSIUM SERPL-SCNC: 4.4 MMOL/L (ref 3.5–5)
RBC # BLD: 2.85 E12/L (ref 3.5–5.5)
SODIUM BLD-SCNC: 141 MMOL/L (ref 132–146)
WBC # BLD: 7.1 E9/L (ref 4.5–11.5)

## 2022-03-18 PROCEDURE — 6370000000 HC RX 637 (ALT 250 FOR IP): Performed by: SURGERY

## 2022-03-18 PROCEDURE — 6360000002 HC RX W HCPCS: Performed by: SURGERY

## 2022-03-18 PROCEDURE — 3700000001 HC ADD 15 MINUTES (ANESTHESIA): Performed by: SURGERY

## 2022-03-18 PROCEDURE — 3700000000 HC ANESTHESIA ATTENDED CARE: Performed by: SURGERY

## 2022-03-18 PROCEDURE — 2709999900 HC NON-CHARGEABLE SUPPLY: Performed by: SURGERY

## 2022-03-18 PROCEDURE — 2580000003 HC RX 258: Performed by: NURSE ANESTHETIST, CERTIFIED REGISTERED

## 2022-03-18 PROCEDURE — 85027 COMPLETE CBC AUTOMATED: CPT

## 2022-03-18 PROCEDURE — 3609027000 HC COLONOSCOPY: Performed by: SURGERY

## 2022-03-18 PROCEDURE — 6360000002 HC RX W HCPCS: Performed by: NURSE ANESTHETIST, CERTIFIED REGISTERED

## 2022-03-18 PROCEDURE — 6370000000 HC RX 637 (ALT 250 FOR IP): Performed by: FAMILY MEDICINE

## 2022-03-18 PROCEDURE — C9113 INJ PANTOPRAZOLE SODIUM, VIA: HCPCS | Performed by: SURGERY

## 2022-03-18 PROCEDURE — 82962 GLUCOSE BLOOD TEST: CPT

## 2022-03-18 PROCEDURE — 2580000003 HC RX 258: Performed by: SURGERY

## 2022-03-18 PROCEDURE — 7100000000 HC PACU RECOVERY - FIRST 15 MIN: Performed by: SURGERY

## 2022-03-18 PROCEDURE — 0DJD8ZZ INSPECTION OF LOWER INTESTINAL TRACT, VIA NATURAL OR ARTIFICIAL OPENING ENDOSCOPIC: ICD-10-PCS | Performed by: SURGERY

## 2022-03-18 PROCEDURE — 85018 HEMOGLOBIN: CPT

## 2022-03-18 PROCEDURE — 80048 BASIC METABOLIC PNL TOTAL CA: CPT

## 2022-03-18 PROCEDURE — 36415 COLL VENOUS BLD VENIPUNCTURE: CPT

## 2022-03-18 PROCEDURE — 7100000001 HC PACU RECOVERY - ADDTL 15 MIN: Performed by: SURGERY

## 2022-03-18 PROCEDURE — 85014 HEMATOCRIT: CPT

## 2022-03-18 RX ORDER — PANTOPRAZOLE SODIUM 40 MG/1
40 TABLET, DELAYED RELEASE ORAL
Qty: 90 TABLET | Refills: 1 | Status: SHIPPED | OUTPATIENT
Start: 2022-03-18

## 2022-03-18 RX ORDER — SUCRALFATE 1 G/1
1 TABLET ORAL 4 TIMES DAILY
Qty: 120 TABLET | Refills: 3 | Status: SHIPPED | OUTPATIENT
Start: 2022-03-18

## 2022-03-18 RX ORDER — SODIUM CHLORIDE 9 MG/ML
INJECTION, SOLUTION INTRAVENOUS CONTINUOUS PRN
Status: DISCONTINUED | OUTPATIENT
Start: 2022-03-18 | End: 2022-03-18 | Stop reason: SDUPTHER

## 2022-03-18 RX ORDER — PROPOFOL 10 MG/ML
INJECTION, EMULSION INTRAVENOUS PRN
Status: DISCONTINUED | OUTPATIENT
Start: 2022-03-18 | End: 2022-03-18 | Stop reason: SDUPTHER

## 2022-03-18 RX ORDER — PANTOPRAZOLE SODIUM 40 MG/10ML
40 INJECTION, POWDER, LYOPHILIZED, FOR SOLUTION INTRAVENOUS 2 TIMES DAILY
Qty: 30 EACH | Refills: 2 | Status: SHIPPED | OUTPATIENT
Start: 2022-03-18 | End: 2022-03-18 | Stop reason: HOSPADM

## 2022-03-18 RX ADMIN — METOPROLOL TARTRATE 25 MG: 25 TABLET, FILM COATED ORAL at 08:40

## 2022-03-18 RX ADMIN — SUCRALFATE 1 G: 1 TABLET ORAL at 12:23

## 2022-03-18 RX ADMIN — LOSARTAN POTASSIUM 100 MG: 50 TABLET, FILM COATED ORAL at 08:40

## 2022-03-18 RX ADMIN — SUCRALFATE 1 G: 1 TABLET ORAL at 00:20

## 2022-03-18 RX ADMIN — PANTOPRAZOLE SODIUM 40 MG: 40 INJECTION, POWDER, FOR SOLUTION INTRAVENOUS at 08:40

## 2022-03-18 RX ADMIN — PROPOFOL 300 MG: 10 INJECTION, EMULSION INTRAVENOUS at 15:09

## 2022-03-18 RX ADMIN — CITALOPRAM HYDROBROMIDE 20 MG: 20 TABLET ORAL at 17:34

## 2022-03-18 RX ADMIN — Medication 10 ML: at 08:41

## 2022-03-18 RX ADMIN — HYDROCHLOROTHIAZIDE 12.5 MG: 12.5 TABLET ORAL at 08:40

## 2022-03-18 RX ADMIN — SODIUM CHLORIDE: 9 INJECTION, SOLUTION INTRAVENOUS at 15:07

## 2022-03-18 RX ADMIN — SUCRALFATE 1 G: 1 TABLET ORAL at 05:55

## 2022-03-18 RX ADMIN — SUCRALFATE 1 G: 1 TABLET ORAL at 17:34

## 2022-03-18 ASSESSMENT — PAIN SCALES - GENERAL
PAINLEVEL_OUTOF10: 0

## 2022-03-18 ASSESSMENT — PAIN DESCRIPTION - LOCATION: LOCATION: ABDOMEN

## 2022-03-18 ASSESSMENT — PAIN DESCRIPTION - PAIN TYPE: TYPE: SURGICAL PAIN

## 2022-03-18 NOTE — PLAN OF CARE
Problem: Falls - Risk of:  Goal: Will remain free from falls  Description: Will remain free from falls  3/18/2022 1745 by Yasmin Villareal RN  Outcome: Completed  3/18/2022 1457 by Yasmin Villareal RN  Outcome: Met This Shift  Goal: Absence of physical injury  Description: Absence of physical injury  3/18/2022 1745 by Yasmin Villareal RN  Outcome: Completed  3/18/2022 1457 by Yasmin Villareal RN  Outcome: Met This Shift     Problem: Bleeding:  Goal: Will show no signs and symptoms of excessive bleeding  Description: Will show no signs and symptoms of excessive bleeding  Outcome: Completed

## 2022-03-18 NOTE — PROGRESS NOTES
Subjective: The patient is awake and alert. Lying in bed asking questions about procedure. Discussion held   Family at bedside  No acute events overnight. Denies chest pain, angina, SOB     Objective:    /70   Pulse 86   Temp 97.7 °F (36.5 °C) (Temporal)   Resp 16   Wt 259 lb (117.5 kg)   SpO2 96%   BMI 41.80 kg/m²     In: 1080 [P.O.:1080]  Out: -   In: 1080   Out: -     General appearance: NAD, conversant  HEENT: AT/NC, MMM  Neck: FROM, supple  Lungs: Clear to auscultation  CV: RRR, no MRGs  Vasc: Radial pulses 2+  Abdomen: Soft, non-tender; no masses or HSM, hyperactive  Extremities: No peripheral edema or digital cyanosis, bilateral lower ext. edema  Skin: no rash, lesions or ulcers  Psych: Alert and oriented to person, place and time  Neuro: Alert and interactive     Recent Labs     03/16/22  0656 03/16/22  1537 03/17/22  0539 03/17/22  0539 03/17/22  1526 03/17/22  2300 03/18/22  0628   WBC 7.1  --  6.7  --   --   --  7.1   HGB 8.2*  7.9*   < > 7.3*   < > 7.5* 7.3* 8.4*  8.3*   HCT 26.1*  25.0*   < > 22.9*   < > 23.3* 22.1* 26.7*  26.4*     --  160  --   --   --  224    < > = values in this interval not displayed. Recent Labs     03/16/22  0656 03/17/22  0539 03/18/22  0628    137 141   K 4.2 4.1 4.4   * 104 101   CO2 23 22 28   BUN 15 13 11   CREATININE 0.5 0.5 0.5   CALCIUM 8.2* 8.5* 8.7       Assessment:    Principal Problem:    GI bleed  Resolved Problems:    * No resolved hospital problems. *      Plan:    57-year-old female with past medical history of depression, and hypertension admitted observation to telemetry unit with     GI bleed  -Telemetry monitoring  -H&H every 6 Hours transfuse as needed to keep hemoglobin greater than 7  -Hemoglobin 7.2 will transfuse 1 unit as patient is symptomatic - repeat 8.2>7.3>8.4  -NPO  -Hold all blood thinners.  -Consult general surgery - EGD - Severe gastritis.   Monitor H&H possible need for c-scope  -PPI/Carafate - continue  -C-scope today     DVT Prophylaxis - pcd's  PT/OT  Discharge planning       OH Cabrera CNP  11:47 AM  3/18/2022     Above note edited to reflect my thoughts     I personally saw, examined and provided care for the patient. Radiographs, labs and medication list were reviewed by me independently. The case was discussed in detail and plans for care were established. Review of Claribel BROWN CNP, documentation was conducted and revisions were made as appropriate directly by me. I agree with the above documented exam, problem list, and plan of care.      Amos Cho MD  8:14 PM  3/18/2022

## 2022-03-18 NOTE — ANESTHESIA POSTPROCEDURE EVALUATION
Department of Anesthesiology  Postprocedure Note    Patient: Yair Tomlinson  MRN: [id number]  YOB: 1955  Date of evaluation: 3/18/2022  Time:  4:16 PM     Procedure Summary     Date: 03/18/22 Room / Location: [facility] 01 / [facility]    Anesthesia Start: 7812 Anesthesia Stop: 5421    Procedure: COLONOSCOPY DIAGNOSTIC (N/A ) Diagnosis: (.)    Surgeons: Elizabeth Harvey MD Responsible Provider: Katarina Byrne DO    Anesthesia Type: MAC ASA Status: 3          Anesthesia Type: MAC    Shivam Phase I: Shivam Score: 10    Shivam Phase II:      Last vitals: Reviewed and per EMR flowsheets.        Anesthesia Post Evaluation    Patient location during evaluation: bedside  Patient participation: complete - patient cannot participate  Level of consciousness: awake and alert  Airway patency: patent  Nausea & Vomiting: no nausea and no vomiting  Complications: no  Cardiovascular status: blood pressure returned to baseline  Respiratory status: acceptable  Hydration status: euvolemic

## 2022-03-18 NOTE — CARE COORDINATION
3/18/22 Update CM note; Patient is npo for colonoscopy today. She continues to run low with h/h. She will discharge home post procedure if found to be negative. She will have no needs.  Electronically signed by Toby Fitzpatrick RN CM on 3/18/2022 at 12:10 PM

## 2022-03-18 NOTE — ANESTHESIA PRE PROCEDURE
Department of Anesthesiology  Preprocedure Note       Name:  Leon Roman   Age:  79 y.o.  :  1955                                          MRN:  [id number]         Date:  3/18/2022      Surgeon: Kaleigh Irizarry):  Caitie Miranda MD    Procedure: Procedure(s):  COLONOSCOPY DIAGNOSTIC    Medications prior to admission:   Prior to Admission medications    Medication Sig Start Date End Date Taking? Authorizing Provider   metoprolol tartrate (LOPRESSOR) 50 MG tablet take half a tablet (25MG) by mouth two times daily 20   Yancy Anderson,    metFORMIN (GLUCOPHAGE-XR) 500 MG extended release tablet take 2 tablets by mouth twice a day 20   Yancy Anderson, DO   losartan (COZAAR) 100 MG tablet TAKE ONE TABLET BY MOUTH EVERY MORNING 20   Geovanni Phelan,    hydroCHLOROthiazide (MICROZIDE) 12.5 MG capsule take 1 capsule by mouth once daily 20   Geovanni Phelan,    citalopram (CELEXA) 20 MG tablet 1 1/2 tabs qd  Patient taking differently: 1/2 tab HS 20   Yancy Anderson DO       Current medications:    No current facility-administered medications for this visit. No current outpatient medications on file.      Facility-Administered Medications Ordered in Other Visits   Medication Dose Route Frequency Provider Last Rate Last Admin    0.9 % sodium chloride infusion   IntraVENous Continuous PRN OH Andrade CRNA   New Bag at 22 1507    sucralfate (CARAFATE) tablet 1 g  1 g Oral 4 times per day OH Santillan CNP   1 g at 22 1223    0.9 % sodium chloride infusion   IntraVENous PRN OH Santillan CNP        hydroCHLOROthiazide (HYDRODIURIL) tablet 12.5 mg  12.5 mg Oral Daily Caitie Miranda MD   12.5 mg at 22 0840    losartan (COZAAR) tablet 100 mg  100 mg Oral Daily Caitie Miranda MD   100 mg at 22 0840    metoprolol tartrate (LOPRESSOR) tablet 25 mg  25 mg Oral BID Caitie Miranda MD   25 mg at 22 0840    insulin lispro (HUMALOG) injection vial 0-6 Units  0-6 Units SubCUTAneous TID WC Navid Perrin MD   2 Units at 03/17/22 1225    insulin lispro (HUMALOG) injection vial 0-3 Units  0-3 Units SubCUTAneous Nightly Navid Perrin MD        glucose (GLUTOSE) 40 % oral gel 15 g  15 g Oral PRN Navid Perrin MD        dextrose 50 % IV solution  12.5 g IntraVENous PRN Navid Perrin MD        glucagon (rDNA) injection 1 mg  1 mg IntraMUSCular PRN Navid Perrin MD        dextrose 5 % solution  100 mL/hr IntraVENous PRN Navid Perrin MD        pantoprazole (PROTONIX) injection 40 mg  40 mg IntraVENous BID Navid Perrin MD   40 mg at 03/18/22 0840    sodium chloride flush 0.9 % injection 10 mL  10 mL IntraVENous 2 times per day Navid Perrin MD   10 mL at 03/18/22 0841    sodium chloride flush 0.9 % injection 10 mL  10 mL IntraVENous PRN Navid Perrin MD        0.9 % sodium chloride infusion  25 mL IntraVENous PRN Navid Perrin MD        potassium chloride (KLOR-CON M) extended release tablet 40 mEq  40 mEq Oral PRN Navid Perrin MD        Or   [place] potassium bicarb-citric acid (EFFER-K) effervescent tablet 40 mEq  40 mEq Oral PRN Navid Perrin MD        Or   [place] potassium chloride 10 mEq/100 mL IVPB (Peripheral Line)  10 mEq IntraVENous PRN Navid Perrin MD        ondansetron (ZOFRAN-ODT) disintegrating tablet 4 mg  4 mg Oral Q8H PRN Navid Perrin MD        Or    ondansetron (ZOFRAN) injection 4 mg  4 mg IntraVENous Q6H PRN Navid Perrin MD        magnesium hydroxide (MILK OF MAGNESIA) 400 MG/5ML suspension 30 mL  30 mL Oral Daily PRN Navid Perrin MD        acetaminophen (TYLENOL) tablet 650 mg  650 mg Oral Q6H PRN Navid Perrin MD   650 mg at 03/15/22 2028    Or    acetaminophen (TYLENOL) suppository 650 mg  650 mg Rectal Q6H PRN Navid Perrin MD        magnesium sulfate 2000 mg in 50 mL IVPB premix  2,000 mg IntraVENous PRN Navid Perrin MD        citalopram (CELEXA) tablet 20 mg  20 mg Oral QPM Francisco Vann MD   20 mg at 03/17/22 1728       Allergies: Allergies   Allergen Reactions    Levofloxacin Rash    Ancef [Cefazolin Sodium] Swelling    Lisinopril Swelling    Lotensin [Benazepril Hcl]     Norco [Hydrocodone-Acetaminophen]      Break out hives       Problem List:    Patient Active Problem List   Diagnosis Code    Osteoarthritis of left knee M17.12    Osteoarthritis of right knee M17.11    GI bleed K92.2    Essential hypertension I10    Mild intermittent asthma without complication D44.78    Type 2 diabetes mellitus without complication (HCC) W90.6    Acute blood loss anemia D62    Lactic acidosis E87.2    GIB (gastrointestinal bleeding) K92.2       Past Medical History:        Diagnosis Date    Depression     Hypertension     Mild intermittent asthma without complication 6/27/0382    Osteoarthritis of left knee 5/17/2012    Urinary incontinence        Past Surgical History:        Procedure Laterality Date    APPENDECTOMY      ARTERY SURGERY      CARPAL TUNNEL RELEASE      ENDOSCOPY, COLON, DIAGNOSTIC      KNEE SURGERY      TUBAL LIGATION      UPPER GASTROINTESTINAL ENDOSCOPY N/A 3/15/2022    EGD DIAGNOSTIC ONLY performed by Francisco Vann MD at [address] History:    Social History     Tobacco Use    Smoking status: Never Smoker    Smokeless tobacco: Never Used   Substance Use Topics    Alcohol use: No                                Counseling given: Not Answered      Vital Signs (Current): There were no vitals filed for this visit.                                            BP Readings from Last 3 Encounters:   03/18/22 129/72   03/18/22 (!) 163/88   03/15/22 136/66       NPO Status:  > 8 hours except for sip of water with meds                                                                               BMI:   Wt Readings from Last 3 Encounters:   03/18/22 259 lb (117.5 kg)   04/30/20 253 lb 9.6 oz (115 kg)   10/04/19 242 lb (109.8 kg) There is no height or weight on file to calculate BMI.    CBC:   Lab Results   Component Value Date    WBC 7.1 03/18/2022    RBC 2.85 03/18/2022    HGB 7.8 03/18/2022    HCT 24.1 03/18/2022    MCV 93.7 03/18/2022    RDW 14.8 03/18/2022     03/18/2022       CMP:   Lab Results   Component Value Date     03/18/2022    K 4.4 03/18/2022    K 5.3 03/15/2022     03/18/2022    CO2 28 03/18/2022    BUN 11 03/18/2022    CREATININE 0.5 03/18/2022    GFRAA >60 03/18/2022    LABGLOM >60 03/18/2022    GLUCOSE 182 03/18/2022    GLUCOSE 110 05/20/2011    PROT 6.6 03/14/2022    CALCIUM 8.7 03/18/2022    BILITOT <0.2 03/14/2022    ALKPHOS 62 03/14/2022    AST 26 03/14/2022    ALT 25 03/14/2022       POC Tests: No results for input(s): POCGLU, POCNA, POCK, POCCL, POCBUN, POCHEMO, POCHCT in the last 72 hours. Coags:   Lab Results   Component Value Date    PROTIME 11.9 05/14/2011    INR 1.1 05/14/2011       HCG (If Applicable): No results found for: PREGTESTUR, PREGSERUM, HCG, HCGQUANT     ABGs: No results found for: PHART, PO2ART, MYO3LZR, UGR3XVF, BEART, G5RMWCYU     Type & Screen (If Applicable):  No results found for: LABABO, LABRH    Drug/Infectious Status (If Applicable):  No results found for: HIV, HEPCAB    COVID-19 Screening (If Applicable): No results found for: COVID19        Anesthesia Evaluation  Patient summary reviewed no history of anesthetic complications:   Airway: Mallampati: III  TM distance: <3 FB   Neck ROM: full  Mouth opening: > = 3 FB Dental: normal exam     Comment: Pt claims she has upper left and lower left back molar abscesses was on clindamyacin, no visual swelling or mass in mouth    Pulmonary:   (+) asthma (stable, no inhalers per pt):                           ROS comment: Cough this AM   Cardiovascular:    (+) hypertension: moderate,                   Neuro/Psych:   (+) psychiatric history:depression/anxiety             GI/Hepatic/Renal:            REBECCA comment: GI bleed. Endo/Other:    (+) DiabetesType II DM, , blood dyscrasia: anemia:., .                 Abdominal:             Vascular: Other Findings:               Anesthesia Plan      MAC     ASA 3       Induction: intravenous. MIPS: Postoperative opioids intended and Prophylactic antiemetics administered. Anesthetic plan and risks discussed with patient. Plan discussed with surgical team and attending.                   OH Hutson - CRNA   3/18/2022

## 2022-03-18 NOTE — OP NOTE
COLONOSCOPY PROCEDURE NOTE    DATE OF PROCEDURE: 3/18/2022    PREOPERATIVE DIAGNOSIS:  anemia    POSTOPERATIVE DIAGNOSIS/FINDINGS:  Normal colonoscopy other then areas of poor prep    SURGEON: Judy Morgan MD    ASSISTANT: None    OPERATION: Total Colonoscopy     ANESTHESIA: Local monitored anesthesia. CONDITION: Stable    COMPLICATIONS: None. Specimens Removed: as above    EBL: None      BRIEF HISTORY:  This is a 79 y.o. female who presents with the complaint of anemia. It was recommended the patient undergo a colonoscopy. The risks/benefits/alternatives/expected outcomes were explained the the patient. The patient verbalized understanding and agreed to proceed. PROCEDURE:  The patient was brought into the endoscopy suite and placed in the left lateral decubitus position. A digital rectal exam was performed after the initiation of LMAC anesthesia and failed to reveal any obstructing masses or lesions. A colonoscope was inserted into the patient's anus and passed through the rectum, sigmoid, descending, transverse, and ascending colon all the way to the level of the cecum. Visualization of the cecum was confirmed by visualization of the ileo-cecal valve, confluence of the tinea, and by visualization of the light in the RLQ on the anterior abdominal wall. The scope was then withdrawn the entire length of the colon. There were no masses, polyps, or lesions noted. Upon reaching the anus, the scope was retroflexed. Limited colonoscopy due to inadequate gi prep. There were no significant hemorrhoids noted. The scope was straightened and withdrawn entirely. The patient tolerated the procedure well and there were no complications.         Judy Morgan MD, MD  3/18/2022

## 2022-03-18 NOTE — ANESTHESIA PRE PROCEDURE
Department of Anesthesiology  Preprocedure Note       Name:  Martina Shukla   Age:  79 y.o.  :  1955                                          MRN:  [id number]         Date:  3/18/2022      Surgeon: Dash Vanessa):  Mary Lopes MD    Procedure: Procedure(s):  COLONOSCOPY DIAGNOSTIC    Medications prior to admission:   Prior to Admission medications    Medication Sig Start Date End Date Taking? Authorizing Provider   metoprolol tartrate (LOPRESSOR) 50 MG tablet take half a tablet (25MG) by mouth two times daily 20   Migdalia Alvarez DO   metFORMIN (GLUCOPHAGE-XR) 500 MG extended release tablet take 2 tablets by mouth twice a day 20   Migdalia Alvarez,    losartan (COZAAR) 100 MG tablet TAKE ONE TABLET BY MOUTH EVERY MORNING 20   Geovanni Dumont,    hydroCHLOROthiazide (MICROZIDE) 12.5 MG capsule take 1 capsule by mouth once daily 20   Geovanni Dumont,    citalopram (CELEXA) 20 MG tablet 1 1/2 tabs qd  Patient taking differently: 1/2 tab HS 20   Migdalia Alvarez DO       Current medications:    No current facility-administered medications for this visit. No current outpatient medications on file.      Facility-Administered Medications Ordered in Other Visits   Medication Dose Route Frequency Provider Last Rate Last Admin    0.9 % sodium chloride infusion   IntraVENous Continuous PRN OH Fatima - CRNA   New Bag at 22 1507    sucralfate (CARAFATE) tablet 1 g  1 g Oral 4 times per day OH Isbell CNP   1 g at 22 1223    0.9 % sodium chloride infusion   IntraVENous PRN OH Isbell CNP        hydroCHLOROthiazide (HYDRODIURIL) tablet 12.5 mg  12.5 mg Oral Daily Mary Lopes MD   12.5 mg at 22 0840    losartan (COZAAR) tablet 100 mg  100 mg Oral Daily Mary Lopes MD   100 mg at 22 0840    metoprolol tartrate (LOPRESSOR) tablet 25 mg  25 mg Oral BID Mary Lopes MD   25 mg at 22 0840    insulin lispro (HUMALOG) injection vial 0-6 Units  0-6 Units SubCUTAneous TID WC Janie Elizondo MD   2 Units at 03/17/22 1225    insulin lispro (HUMALOG) injection vial 0-3 Units  0-3 Units SubCUTAneous Nightly Jnaie Elizondo MD        glucose (GLUTOSE) 40 % oral gel 15 g  15 g Oral PRN Janie Elizondo MD        dextrose 50 % IV solution  12.5 g IntraVENous PRN Janie Elizondo MD        glucagon (rDNA) injection 1 mg  1 mg IntraMUSCular PRN Janie Elizondo MD        dextrose 5 % solution  100 mL/hr IntraVENous PRN Janie Elizondo MD        pantoprazole (PROTONIX) injection 40 mg  40 mg IntraVENous BID Janie Elizondo MD   40 mg at 03/18/22 0840    sodium chloride flush 0.9 % injection 10 mL  10 mL IntraVENous 2 times per day Janie Elizondo MD   10 mL at 03/18/22 0841    sodium chloride flush 0.9 % injection 10 mL  10 mL IntraVENous PRN Janie Elizondo MD        0.9 % sodium chloride infusion  25 mL IntraVENous PRN Janie Elizondo MD        potassium chloride (KLOR-CON M) extended release tablet 40 mEq  40 mEq Oral PRN Janie Elizondo MD        Or   [facility] potassium bicarb-citric acid (EFFER-K) effervescent tablet 40 mEq  40 mEq Oral PRN Janie Elizondo MD        Or   [facility] potassium chloride 10 mEq/100 mL IVPB (Peripheral Line)  10 mEq IntraVENous PRN Janie Elizondo MD        ondansetron (ZOFRAN-ODT) disintegrating tablet 4 mg  4 mg Oral Q8H PRN Janie Elizondo MD        Or    ondansetron (ZOFRAN) injection 4 mg  4 mg IntraVENous Q6H PRN Janie Elizondo MD        magnesium hydroxide (MILK OF MAGNESIA) 400 MG/5ML suspension 30 mL  30 mL Oral Daily PRN Janie Elizondo MD        acetaminophen (TYLENOL) tablet 650 mg  650 mg Oral Q6H PRN Janie Elizondo MD   650 mg at 03/15/22 2028    Or    acetaminophen (TYLENOL) suppository 650 mg  650 mg Rectal Q6H PRN Janie Elizondo MD        magnesium sulfate 2000 mg in 50 mL IVPB premix  2,000 mg IntraVENous PRN Janie Elizondo MD        citalopram (CELEXA) tablet 20 mg  20 mg Oral QPM Nicole Dozier MD   20 mg at 03/17/22 1728       Allergies: Allergies   Allergen Reactions    Levofloxacin Rash    Ancef [Cefazolin Sodium] Swelling    Lisinopril Swelling    Lotensin [Benazepril Hcl]     Norco [Hydrocodone-Acetaminophen]      Break out hives       Problem List:    Patient Active Problem List   Diagnosis Code    Osteoarthritis of left knee M17.12    Osteoarthritis of right knee M17.11    GI bleed K92.2    Essential hypertension I10    Mild intermittent asthma without complication Z23.58    Type 2 diabetes mellitus without complication (HCC) O75.5    Acute blood loss anemia D62    Lactic acidosis E87.2    GIB (gastrointestinal bleeding) K92.2       Past Medical History:        Diagnosis Date    Depression     Hypertension     Mild intermittent asthma without complication 0/72/4855    Osteoarthritis of left knee 5/17/2012    Urinary incontinence        Past Surgical History:        Procedure Laterality Date    APPENDECTOMY      ARTERY SURGERY      CARPAL TUNNEL RELEASE      ENDOSCOPY, COLON, DIAGNOSTIC      KNEE SURGERY      TUBAL LIGATION      UPPER GASTROINTESTINAL ENDOSCOPY N/A 3/15/2022    EGD DIAGNOSTIC ONLY performed by Nicole Dozier MD at [facility] History:    Social History     Tobacco Use    Smoking status: Never Smoker    Smokeless tobacco: Never Used   Substance Use Topics    Alcohol use: No                                Counseling given: Not Answered      Vital Signs (Current): There were no vitals filed for this visit.                                            BP Readings from Last 3 Encounters:   03/18/22 129/72   03/18/22 (!) 163/88   03/15/22 136/66       NPO Status:  > 8 hours except for sip of water with meds                                                                               BMI:   Wt Readings from Last 3 Encounters:   03/18/22 259 lb (117.5 kg)   04/30/20 253 lb 9.6 oz (115 kg)   10/04/19 242 lb (109.8 kg) There is no height or weight on file to calculate BMI.    CBC:   Lab Results   Component Value Date    WBC 7.1 03/18/2022    RBC 2.85 03/18/2022    HGB 7.8 03/18/2022    HCT 24.1 03/18/2022    MCV 93.7 03/18/2022    RDW 14.8 03/18/2022     03/18/2022       CMP:   Lab Results   Component Value Date     03/18/2022    K 4.4 03/18/2022    K 5.3 03/15/2022     03/18/2022    CO2 28 03/18/2022    BUN 11 03/18/2022    CREATININE 0.5 03/18/2022    GFRAA >60 03/18/2022    LABGLOM >60 03/18/2022    GLUCOSE 182 03/18/2022    GLUCOSE 110 05/20/2011    PROT 6.6 03/14/2022    CALCIUM 8.7 03/18/2022    BILITOT <0.2 03/14/2022    ALKPHOS 62 03/14/2022    AST 26 03/14/2022    ALT 25 03/14/2022       POC Tests: No results for input(s): POCGLU, POCNA, POCK, POCCL, POCBUN, POCHEMO, POCHCT in the last 72 hours. Coags:   Lab Results   Component Value Date    PROTIME 11.9 05/14/2011    INR 1.1 05/14/2011       HCG (If Applicable): No results found for: PREGTESTUR, PREGSERUM, HCG, HCGQUANT     ABGs: No results found for: PHART, PO2ART, MDP5VFM, EGP3EEB, BEART, F6IWPETX     Type & Screen (If Applicable):  No results found for: LABABO, LABRH    Drug/Infectious Status (If Applicable):  No results found for: HIV, HEPCAB    COVID-19 Screening (If Applicable): No results found for: COVID19        Anesthesia Evaluation  Patient summary reviewed no history of anesthetic complications:   Airway: Mallampati: III  TM distance: <3 FB   Neck ROM: full  Mouth opening: > = 3 FB Dental: normal exam     Comment: Pt claims she has upper left and lower left back molar abscesses was on clindamyacin, no visual swelling or mass in mouth    Pulmonary: breath sounds clear to auscultation  (+) asthma (stable, no inhalers per pt):                           REBECCA comment: Cough this AM   Cardiovascular:    (+) hypertension: moderate,       ECG reviewed  Rhythm: regular  Rate: normal                 ROS comment: EKG: Normal Sinus Rhythm 82. Neuro/Psych:   (+) psychiatric history:depression/anxiety             GI/Hepatic/Renal:            ROS comment: GI bleed. Urinary Incontinence. .   Endo/Other:    (+) DiabetesType II DM, , blood dyscrasia: anemia, arthritis (Left knee.): OA., .                 Abdominal:             Vascular: negative vascular ROS. Other Findings:               Anesthesia Plan      MAC     ASA 3       Induction: intravenous. MIPS: Postoperative opioids intended and Prophylactic antiemetics administered. Anesthetic plan and risks discussed with patient. Plan discussed with surgical team, attending and CRNA.     Attending anesthesiologist reviewed and agrees with Heidi Cutler MD   3/18/2022

## 2022-03-29 NOTE — PROGRESS NOTES
Physician Progress Note      Ryan Devi  CSN #:                  [id number]  :                       1955  ADMIT DATE:       3/14/2022 8:13 AM  DISCH DATE:        3/18/2022 6:17 PM  RESPONDING  PROVIDER #:        HERMINIA Johnson MD          QUERY TEXT:    Patient admitted with GI bleed , noted to have Lactic acidosis in ER with a   level of 4.8. If possible, please document in progress notes and discharge   summary if you are evaluating and/or treating any of the following: The medical record reflects the following:  Risk Factors:  GI bleed,  Clinical Indicators: Lactic acid 4.8 , 4.1, 4.4, 2.2. Treatment:  ml/hr, IVF 50 ml/hr, Cleocin,    Thank you    Colton Byrd RN, BSN, [facility]  [phone]  Options provided:  -- Lactic acidosis  -- Lactic acidosis  not clinically significant  -- Other - I will add my own diagnosis  -- Disagree - Not applicable / Not valid  -- Disagree - Clinically unable to determine / Unknown  -- Refer to Clinical Documentation Reviewer    PROVIDER RESPONSE TEXT:    This patient has Lactic acidosis .     Query created by: Brando Smith on 3/21/2022 10:53 AM      Electronically signed by:  HERMINIA Johnson MD 3/29/2022 8:49 AM

## 2022-04-21 ENCOUNTER — HOSPITAL ENCOUNTER (EMERGENCY)
Age: 67
Discharge: HOME | End: 2022-04-21
Attending: EMERGENCY MEDICINE
Payer: MEDICARE

## 2022-04-21 ENCOUNTER — APPOINTMENT (OUTPATIENT)
Dept: GENERAL RADIOLOGY | Age: 67
End: 2022-04-21
Payer: MEDICARE

## 2022-04-21 VITALS
RESPIRATION RATE: 16 BRPM | SYSTOLIC BLOOD PRESSURE: 179 MMHG | TEMPERATURE: 97.4 F | HEIGHT: 66 IN | DIASTOLIC BLOOD PRESSURE: 76 MMHG | HEART RATE: 96 BPM | BODY MASS INDEX: 41.62 KG/M2 | OXYGEN SATURATION: 100 % | WEIGHT: 259 LBS

## 2022-04-21 DIAGNOSIS — D64.9 SYMPTOMATIC ANEMIA: Primary | ICD-10-CM

## 2022-04-21 LAB
ABO/RH: NORMAL
ALBUMIN SERPL-MCNC: 3.9 G/DL (ref 3.5–5.2)
ALP BLD-CCNC: 69 U/L (ref 35–104)
ALT SERPL-CCNC: 27 U/L (ref 0–32)
ANION GAP SERPL CALCULATED.3IONS-SCNC: 12 MMOL/L (ref 7–16)
ANISOCYTOSIS: ABNORMAL
ANTIBODY SCREEN: NORMAL
AST SERPL-CCNC: 30 U/L (ref 0–31)
BACTERIA: ABNORMAL /HPF
BASOPHILS ABSOLUTE: 0.05 E9/L (ref 0–0.2)
BASOPHILS RELATIVE PERCENT: 0.8 % (ref 0–2)
BILIRUB SERPL-MCNC: 0.3 MG/DL (ref 0–1.2)
BILIRUBIN URINE: NEGATIVE
BLOOD, URINE: NEGATIVE
BUN BLDV-MCNC: 13 MG/DL (ref 6–23)
CALCIUM SERPL-MCNC: 8.8 MG/DL (ref 8.6–10.2)
CHLORIDE BLD-SCNC: 101 MMOL/L (ref 98–107)
CLARITY: CLEAR
CO2: 23 MMOL/L (ref 22–29)
COLOR: YELLOW
CREAT SERPL-MCNC: 0.5 MG/DL (ref 0.5–1)
EKG ATRIAL RATE: 79 BPM
EKG P AXIS: 43 DEGREES
EKG P-R INTERVAL: 144 MS
EKG Q-T INTERVAL: 390 MS
EKG QRS DURATION: 74 MS
EKG QTC CALCULATION (BAZETT): 447 MS
EKG R AXIS: 34 DEGREES
EKG T AXIS: 32 DEGREES
EKG VENTRICULAR RATE: 79 BPM
EOSINOPHILS ABSOLUTE: 0.06 E9/L (ref 0.05–0.5)
EOSINOPHILS RELATIVE PERCENT: 0.9 % (ref 0–6)
EPITHELIAL CELLS, UA: ABNORMAL /HPF
GFR AFRICAN AMERICAN: >60
GFR NON-AFRICAN AMERICAN: >60 ML/MIN/1.73
GLUCOSE BLD-MCNC: 146 MG/DL (ref 74–99)
GLUCOSE URINE: NEGATIVE MG/DL
HCT VFR BLD CALC: 24.9 % (ref 34–48)
HEMOGLOBIN: 7.2 G/DL (ref 11.5–15.5)
HYPOCHROMIA: ABNORMAL
IMMATURE GRANULOCYTES #: 0.04 E9/L
IMMATURE GRANULOCYTES %: 0.6 % (ref 0–5)
KETONES, URINE: NEGATIVE MG/DL
LEUKOCYTE ESTERASE, URINE: ABNORMAL
LIPASE: 77 U/L (ref 13–60)
LYMPHOCYTES ABSOLUTE: 1.67 E9/L (ref 1.5–4)
LYMPHOCYTES RELATIVE PERCENT: 25.5 % (ref 20–42)
MCH RBC QN AUTO: 22 PG (ref 26–35)
MCHC RBC AUTO-ENTMCNC: 28.9 % (ref 32–34.5)
MCV RBC AUTO: 76.1 FL (ref 80–99.9)
MONOCYTES ABSOLUTE: 0.66 E9/L (ref 0.1–0.95)
MONOCYTES RELATIVE PERCENT: 10.1 % (ref 2–12)
NEUTROPHILS ABSOLUTE: 4.08 E9/L (ref 1.8–7.3)
NEUTROPHILS RELATIVE PERCENT: 62.1 % (ref 43–80)
NITRITE, URINE: NEGATIVE
OVALOCYTES: ABNORMAL
PDW BLD-RTO: 18.6 FL (ref 11.5–15)
PH UA: 6 (ref 5–9)
PLATELET # BLD: 294 E9/L (ref 130–450)
PMV BLD AUTO: 9.9 FL (ref 7–12)
POIKILOCYTES: ABNORMAL
POLYCHROMASIA: ABNORMAL
POTASSIUM REFLEX MAGNESIUM: 4.1 MMOL/L (ref 3.5–5)
PRO-BNP: 176 PG/ML (ref 0–125)
PROTEIN UA: NEGATIVE MG/DL
RBC # BLD: 3.27 E12/L (ref 3.5–5.5)
RBC UA: ABNORMAL /HPF (ref 0–2)
SODIUM BLD-SCNC: 136 MMOL/L (ref 132–146)
SPECIFIC GRAVITY UA: 1.02 (ref 1–1.03)
TOTAL PROTEIN: 6.8 G/DL (ref 6.4–8.3)
TROPONIN, HIGH SENSITIVITY: 9 NG/L (ref 0–9)
TROPONIN, HIGH SENSITIVITY: 9 NG/L (ref 0–9)
UROBILINOGEN, URINE: 0.2 E.U./DL
WBC # BLD: 6.6 E9/L (ref 4.5–11.5)
WBC UA: ABNORMAL /HPF (ref 0–5)

## 2022-04-21 PROCEDURE — 93010 ELECTROCARDIOGRAM REPORT: CPT | Performed by: INTERNAL MEDICINE

## 2022-04-21 PROCEDURE — 85025 COMPLETE CBC W/AUTO DIFF WBC: CPT

## 2022-04-21 PROCEDURE — 86900 BLOOD TYPING SEROLOGIC ABO: CPT

## 2022-04-21 PROCEDURE — 84484 ASSAY OF TROPONIN QUANT: CPT

## 2022-04-21 PROCEDURE — 80053 COMPREHEN METABOLIC PANEL: CPT

## 2022-04-21 PROCEDURE — 93005 ELECTROCARDIOGRAM TRACING: CPT | Performed by: PHYSICIAN ASSISTANT

## 2022-04-21 PROCEDURE — 86850 RBC ANTIBODY SCREEN: CPT

## 2022-04-21 PROCEDURE — 83880 ASSAY OF NATRIURETIC PEPTIDE: CPT

## 2022-04-21 PROCEDURE — 81001 URINALYSIS AUTO W/SCOPE: CPT

## 2022-04-21 PROCEDURE — 99285 EMERGENCY DEPT VISIT HI MDM: CPT

## 2022-04-21 PROCEDURE — 36415 COLL VENOUS BLD VENIPUNCTURE: CPT

## 2022-04-21 PROCEDURE — 71046 X-RAY EXAM CHEST 2 VIEWS: CPT

## 2022-04-21 PROCEDURE — 86901 BLOOD TYPING SEROLOGIC RH(D): CPT

## 2022-04-21 PROCEDURE — 83690 ASSAY OF LIPASE: CPT

## 2022-04-21 RX ORDER — 0.9 % SODIUM CHLORIDE 0.9 %
1000 INTRAVENOUS SOLUTION INTRAVENOUS ONCE
Status: DISCONTINUED | OUTPATIENT
Start: 2022-04-21 | End: 2022-04-21 | Stop reason: HOSPADM

## 2022-04-21 ASSESSMENT — ENCOUNTER SYMPTOMS
ABDOMINAL PAIN: 0
EYE PAIN: 0
NAUSEA: 0
SORE THROAT: 0
RHINORRHEA: 0
VOMITING: 0
WHEEZING: 0
DIARRHEA: 0
SHORTNESS OF BREATH: 1

## 2022-04-21 NOTE — ED NOTES
Department of Emergency Medicine    FIRST PROVIDER TRIAGE NOTE             Independent MLP           4/21/22  11:54 AM EDT    Date of Encounter: 4/21/22   MRN: [id number]    Vitals:    04/21/22 1151 04/21/22 1154   BP:  (!) 179/76   Pulse: 96    Resp: 16    Temp: 97.4 °F (36.3 °C)    SpO2: 100%    Weight: 259 lb (117.5 kg)    Height: 5' 6\" (1.676 m)       HPI: Tova Sanchez is a 79 y.o. female who presents to the ED for Abnormal Lab (low hgb, 7.3, has been an ongoing issue) and Shortness of Breath  Patient reports increased fatigue as well     ROS: Negative for vomiting, diarrhea or urinary complaints. Physical Exam:   Gen Appearance/Constitutional: alert  CV: regular rate  Pulm: CTA bilat     Initial Plan of Care: All treatment areas with department are currently occupied. Plan to order/Initiate the following while awaiting opening in ED: labs, EKG and imaging studies.     Initial Plan of Care: Initiate Treatment-Testing, Proceed toTreatment Area When Bed Available for ED Attending/MLP to Continue Care    Electronically signed by Colin Munoz PA-C   DD: 4/21/22       Colin Munoz PA-C  04/21/22 0328

## 2022-04-21 NOTE — ED PROVIDER NOTES
Patient is a 57-year-old female with past medical history of GI bleed last month without known source after colonoscopy and endoscopy, hypertension, diabetes, and asthma who presents with shortness of breath, chest pain. Patient states the symptoms have been ongoing since her admission on 3/8/2022 for GI bleed. At that time she was having melena and received 1 unit of packed red blood cells improve her hemoglobin. Patient states she had a GI bleed in 2018 from which she recovered quickly, however she is not recovering as quickly this time. She denies bleeding, melena, hematochezia, hematemesis, hematuria. Patient states she has chest pain when she becomes short of breath, this pain radiates to her back. Patient's shortness of breath is worse with activity, as is her chest pain. Patient states her legs have also been slightly more swollen than usual.           Review of Systems   Constitutional: Negative for chills and fever. HENT: Negative for congestion, rhinorrhea and sore throat. Eyes: Negative for pain and visual disturbance. Respiratory: Positive for shortness of breath. Negative for wheezing. Cardiovascular: Positive for chest pain and leg swelling. Negative for palpitations. Gastrointestinal: Negative for abdominal pain, diarrhea, nausea and vomiting. Genitourinary: Negative for dysuria and hematuria. Musculoskeletal: Negative for arthralgias and myalgias. Skin: Negative for rash and wound. Neurological: Negative for dizziness and headaches. Physical Exam  Vitals and nursing note reviewed. Constitutional:       General: She is not in acute distress. Appearance: Normal appearance. Comments: pale   HENT:      Head: Normocephalic and atraumatic. Right Ear: Tympanic membrane, ear canal and external ear normal.      Left Ear: Tympanic membrane, ear canal and external ear normal.      Nose: Nose normal. No rhinorrhea.       Mouth/Throat:      Mouth: Mucous membranes are moist.      Pharynx: Oropharynx is clear. Eyes:      General:         Right eye: No discharge. Left eye: No discharge. Extraocular Movements: Extraocular movements intact. Conjunctiva/sclera: Conjunctivae normal.   Cardiovascular:      Rate and Rhythm: Normal rate and regular rhythm. Heart sounds: Normal heart sounds. No murmur heard. Pulmonary:      Effort: Pulmonary effort is normal. No tachypnea. Breath sounds: Normal breath sounds. No wheezing. Chest:      Chest wall: No tenderness. Abdominal:      General: Abdomen is flat. Bowel sounds are normal.      Palpations: Abdomen is soft. Tenderness: There is no abdominal tenderness. Musculoskeletal:         General: No tenderness. Normal range of motion. Cervical back: Normal range of motion and neck supple. No rigidity. Right lower leg: No edema. Left lower leg: No edema. Skin:     General: Skin is warm and dry. Findings: No rash. Neurological:      General: No focal deficit present. Mental Status: She is alert and oriented to person, place, and time. Mental status is at baseline. Procedures     MDM  Number of Diagnoses or Management Options  Symptomatic anemia  Diagnosis management comments: Patient is a 61-year-old female who presented with shortness of breath and chest tightness. Patient seen and examined. Work-up significant for hemoglobin 7.2 which is near baseline from previous discharge, UA with asymptomatic bacteriuria, normal CMP, very slightly elevated proBNP, very slightly elevated troponin with delta 0, and very slightly elevated lipase. Due to patient concern, Dr. Jr Torres was consulted and agreed to see the patient as outpatient, recommended iron studies. After discussion with patient decision was made to discharge the patient home with close follow-up with hematology and PCP.        Amount and/or Complexity of Data Reviewed  Clinical lab tests: reviewed  Tests in the radiology section of CPT®: reviewed  Tests in the medicine section of CPT®: reviewed         ED Course as of 04/21/22 1621   Thu Apr 21, 2022   1332 Checked on patient, states her symptoms are unchanged. States she is sleepy. [SB]   0990 Patient reports symptoms unchanged, having difficulty getting ahold of the [facility]. [SB]   [phone] with Dr. Parth Mason.  Recommending outpatient follow up and iron studies. Office phone number provided to patient. [SB]      ED Course User Index  [SB] Pedro Muñoz DO       ED Course as of 04/21/22 1621   Thu Apr 21, 2022   1332 Checked on patient, states her symptoms are unchanged. States she is sleepy. [SB]   3774 Patient reports symptoms unchanged, having difficulty getting ahold of the [facility]. [SB]   [phone] with Dr. Parth Mason.  Recommending outpatient follow up and iron studies. Office phone number provided to patient. [SB]      ED Course User Index  [SB] Eulalia Funk, DO       --------------------------------------------- PAST HISTORY ---------------------------------------------  Past Medical History:  has a past medical history of Depression, Hypertension, Mild intermittent asthma without complication, Osteoarthritis of left knee, and Urinary incontinence. Past Surgical History:  has a past surgical history that includes Tubal ligation; Appendectomy; Artery surgery; Carpal tunnel release; knee surgery; Endoscopy, colon, diagnostic; Upper gastrointestinal endoscopy (N/A, 3/15/2022); and Colonoscopy (N/A, 3/18/2022). Social History:  reports that she has never smoked. She has never used smokeless tobacco. She reports that she does not drink alcohol and does not use drugs. Family History: family history is not on file. The patients home medications have been reviewed.     Allergies: Levofloxacin, Ancef [cefazolin sodium], Lisinopril, Lotensin [benazepril hcl], and Norco [hydrocodone-acetaminophen]    -------------------------------------------------- RESULTS -------------------------------------------------  Labs:  Results for orders placed or performed during the hospital encounter of 04/21/22   CBC with Auto Differential   Result Value Ref Range    WBC 6.6 4.5 - 11.5 E9/L    RBC 3.27 (L) 3.50 - 5.50 E12/L    Hemoglobin 7.2 (L) 11.5 - 15.5 g/dL    Hematocrit 24.9 (L) 34.0 - 48.0 %    MCV 76.1 (L) 80.0 - 99.9 fL    MCH 22.0 (L) 26.0 - 35.0 pg    MCHC 28.9 (L) 32.0 - 34.5 %    RDW 18.6 (H) 11.5 - 15.0 fL    Platelets 131 651 - 141 E9/L    MPV 9.9 7.0 - 12.0 fL    Neutrophils % 62.1 43.0 - 80.0 %    Immature Granulocytes % 0.6 0.0 - 5.0 %    Lymphocytes % 25.5 20.0 - 42.0 %    Monocytes % 10.1 2.0 - 12.0 %    Eosinophils % 0.9 0.0 - 6.0 %    Basophils % 0.8 0.0 - 2.0 %    Neutrophils Absolute 4.08 1.80 - 7.30 E9/L    Immature Granulocytes # 0.04 E9/L    Lymphocytes Absolute 1.67 1.50 - 4.00 E9/L    Monocytes Absolute 0.66 0.10 - 0.95 E9/L    Eosinophils Absolute 0.06 0.05 - 0.50 E9/L    Basophils Absolute 0.05 0.00 - 0.20 E9/L    Anisocytosis 1+     Polychromasia 1+     Hypochromia 2+     Poikilocytes 1+     Ovalocytes 1+    Comprehensive Metabolic Panel w/ Reflex to MG   Result Value Ref Range    Sodium 136 132 - 146 mmol/L    Potassium reflex Magnesium 4.1 3.5 - 5.0 mmol/L    Chloride 101 98 - 107 mmol/L    CO2 23 22 - 29 mmol/L    Anion Gap 12 7 - 16 mmol/L    Glucose 146 (H) 74 - 99 mg/dL    BUN 13 6 - 23 mg/dL    CREATININE 0.5 0.5 - 1.0 mg/dL    GFR Non-African American >60 >=60 mL/min/1.73    GFR African American >60     Calcium 8.8 8.6 - 10.2 mg/dL    Total Protein 6.8 6.4 - 8.3 g/dL    Albumin 3.9 3.5 - 5.2 g/dL    Total Bilirubin 0.3 0.0 - 1.2 mg/dL    Alkaline Phosphatase 69 35 - 104 U/L    ALT 27 0 - 32 U/L    AST 30 0 - 31 U/L   Troponin   Result Value Ref Range    Troponin, High Sensitivity 9 0 - 9 ng/L   Brain Natriuretic Peptide   Result Value Ref Range    Pro- (H) 0 - 125 pg/mL   Lipase   Result Value Ref Range    Lipase 77 (H) 13 - 60 U/L   Urinalysis with Microscopic   Result Value Ref Range    Color, UA Yellow Straw/Yellow    Clarity, UA Clear Clear    Glucose, Ur Negative Negative mg/dL    Bilirubin Urine Negative Negative    Ketones, Urine Negative Negative mg/dL    Specific Gravity, UA 1.020 1.005 - 1.030    Blood, Urine Negative Negative    pH, UA 6.0 5.0 - 9.0    Protein, UA Negative Negative mg/dL    Urobilinogen, Urine 0.2 <2.0 E.U./dL    Nitrite, Urine Negative Negative    Leukocyte Esterase, Urine MODERATE (A) Negative    WBC, UA 5-10 (A) 0 - 5 /HPF    RBC, UA NONE 0 - 2 /HPF    Epithelial Cells, UA FEW /HPF    Bacteria, UA MODERATE (A) None Seen /HPF   Troponin   Result Value Ref Range    Troponin, High Sensitivity 9 0 - 9 ng/L   EKG 12 Lead   Result Value Ref Range    Ventricular Rate 79 BPM    Atrial Rate 79 BPM    P-R Interval 144 ms    QRS Duration 74 ms    Q-T Interval 390 ms    QTc Calculation (Bazett) 447 ms    P Axis 43 degrees    R Axis 34 degrees    T Axis 32 degrees   TYPE AND SCREEN   Result Value Ref Range    ABO/Rh A POS     Antibody Screen NEG        Radiology:  XR CHEST (2 VW)   Final Result   No pneumonia or pleural effusion.             ------------------------- NURSING NOTES AND VITALS REVIEWED ---------------------------  Date / Time Roomed:  4/21/2022 12:07 PM  ED Bed Assignment:  20/20    The nursing notes within the ED encounter and vital signs as below have been reviewed.    BP (!) 179/76   Pulse 96   Temp 97.4 °F (36.3 °C)   Resp 16   Ht 5' 6\" (1.676 m)   Wt 259 lb (117.5 kg)   SpO2 100%   BMI 41.80 kg/m²   Oxygen Saturation Interpretation: Normal      ------------------------------------------ PROGRESS NOTES ------------------------------------------  4:18 PM EDT  I have spoken with the patient and discussed todays results, in addition to providing specific details for the plan of care and counseling regarding the diagnosis and prognosis. Their questions are answered at this time and they are agreeable with the plan. I discussed at length with them reasons for immediate return here for re evaluation. They will followup with their primary care physician by calling their office tomorrow. --------------------------------- ADDITIONAL PROVIDER NOTES ---------------------------------  At this time the patient is without objective evidence of an acute process requiring hospitalization or inpatient management. They have remained hemodynamically stable throughout their entire ED visit and are stable for discharge with outpatient follow-up. The plan has been discussed in detail and they are aware of the specific conditions for emergent return, as well as the importance of follow-up. New Prescriptions    No medications on file       Diagnosis:  1. Symptomatic anemia        Disposition:  Patient's disposition: Discharge to home  Patient's condition is stable.        Austin Vazquez DO  Resident  04/21/22 7015

## 2022-04-22 NOTE — DISCHARGE SUMMARY
[facility]   Discharge summary   Patient ID:  Raffy Cha  [id number]  15 y.o.  1955    Admit date: 3/14/2022    Discharge date and time: 3/18/2022    Admission Diagnoses:   Patient Active Problem List   Diagnosis    Osteoarthritis of left knee    Osteoarthritis of right knee    GI bleed    Essential hypertension    Mild intermittent asthma without complication    Type 2 diabetes mellitus without complication (HCC)    Acute blood loss anemia    Lactic acidosis    GIB (gastrointestinal bleeding)       Discharge Diagnoses: GI Bleed    Consults: general surgery    Procedures: EGD/C-Scope    Hospital Course: The patient is a 79 y.o. female of [id number] [place] [address],      26-year-old female with past medical history of depression, and hypertension admitted observation to telemetry unit with     GI bleed  -Telemetry monitoring  -H&H every 6 Hours transfuse as needed to keep hemoglobin greater than 7  -Hemoglobin 7.2 will transfuse 1 unit as patient is symptomatic - repeat 8.2>7.3>8.4  -NPO  -Hold all blood thinners.  -Consult general surgery - EGD - Severe gastritis. Monitor H&H possible need for c-scope  -PPI/Carafate - continue  -C-scope today - negative    Patient discharged home in stable conditions with medications listed below. Patient instructed to follow up with all consultants and PCP within 3 weeks of discharge. Recent Labs     04/21/22  1242   WBC 6.6   HGB 7.2*   HCT 24.9*          Recent Labs     04/21/22  1242      K 4.1      CO2 23   BUN 13   CREATININE 0.5   CALCIUM 8.8       XR CHEST (2 VW)    Result Date: 4/21/2022  EXAMINATION: TWO XRAY VIEWS OF THE CHEST 4/21/2022 1:19 pm COMPARISON: March 14, 2022 HISTORY: ORDERING SYSTEM PROVIDED HISTORY: Shortness of breath TECHNOLOGIST PROVIDED HISTORY: Reason for exam:->Shortness of breath FINDINGS: No airspace opacity or pleural effusion. The heart is normal size. No pneumothorax.  No free air beneath the hemidiaphragms. No pneumonia or pleural effusion. Discharge Exam:    HEENT: NCAT,  PERRLA, No JVD  Heart:  RRR, no murmurs, gallops, or rubs. Lungs:  CTA bilaterally, no wheeze, rales or rhonchi  Abd: bowel sounds present, nontender, nondistended, no masses  Extrem:  No clubbing, cyanosis, or edema    Disposition: home     Patient Condition at Discharge: Stable    Patient Instructions:      Medication List      START taking these medications    pantoprazole 40 MG tablet  Commonly known as: PROTONIX  Take 1 tablet by mouth every morning (before breakfast)     sucralfate 1 GM tablet  Commonly known as: CARAFATE  Take 1 tablet by mouth 4 times daily        CHANGE how you take these medications    citalopram 20 MG tablet  Commonly known as: CELEXA  1 1/2 tabs qd  What changed: additional instructions        CONTINUE taking these medications    hydroCHLOROthiazide 12.5 MG capsule  Commonly known as: MICROZIDE  take 1 capsule by mouth once daily     losartan 100 MG tablet  Commonly known as: COZAAR  TAKE ONE TABLET BY MOUTH EVERY MORNING     metFORMIN 500 MG extended release tablet  Commonly known as: GLUCOPHAGE-XR  take 2 tablets by mouth twice a day     metoprolol tartrate 50 MG tablet  Commonly known as: LOPRESSOR  take half a tablet (25MG) by mouth two times daily           Where to Get Your Medications      These medications were sent to [address] [place] [address] Drive  [address] [place], [place] [address] [id number]    Phone: [phone]   · pantoprazole 40 MG tablet  · sucralfate 1 GM tablet       Activity: activity as tolerated  Diet: regular diet    Pt has been advised to: Follow-up with Eugenio Tang DO in 1 week.   Follow-up with consultants as recommended by them    Note that over 30 minutes was spent in preparing discharge papers, discussing discharge with patient, medication review, etc.    Signed:  Georgie Peabody, MD  3/18/2022

## (undated) DEVICE — DEFENDO AIR WATER SUCTION AND BIOPSY VALVE KIT FOR  OLYMPUS: Brand: DEFENDO AIR/WATER/SUCTION AND BIOPSY VALVE

## (undated) DEVICE — Z DISCONTINUED NO SUB IDED TUBING ETCO2 AD L6.5FT NSL ORAL CVD PRNG NONFLARED TIP OVR

## (undated) DEVICE — BITEBLOCK 54FR W/ DENT RIM BLOX

## (undated) DEVICE — CONNECTOR IRRIGATION AUXILIARY H2O JET W/ PRT MTL THRD HYDR

## (undated) DEVICE — GAUZE,SPONGE,POST-OP,4X3,STRL,LF: Brand: MEDLINE